# Patient Record
Sex: FEMALE | Race: WHITE
[De-identification: names, ages, dates, MRNs, and addresses within clinical notes are randomized per-mention and may not be internally consistent; named-entity substitution may affect disease eponyms.]

---

## 2018-04-04 NOTE — HP
PREOPERATIVE HISTORY AND PHYSICAL:

 

DATE OF SURGERY/ADMISSION:  18 [facility]

 

DATE OF OFFICE VISIT/ENCOUNTER:  18

 

ATTENDING SURGEON:  Lavonne Minaya MD * (DICTATED BY DIALLO EPPS)

 

PROCEDURE:  Right wrist ganglion cyst excision.

 

CHIEF COMPLAINT:  Pain and cyst, right wrist.

 

HISTORY OF PRESENT ILLNESS:  This is a 32-year-old female.  She is a physician 
at [employer].  She is complaining of right wrist pain and a cyst-
like structure on the dorsum of the wrist that has been present for some time 
now.  She thinks that she has had pain in the wrist for about 7 years and it is 
related to a time when she was pushing herself up out of a pool and she felt 
some pain in the wrist.  She has noticed what looks to be a cyst-like structure 
on the dorsal radial aspect of the wrist that has grown in size overtime.  She 
recently had an MRI of the wrist, which showed a multiloculated dorsal wrist 
ganglion at the radiocarpal joint.  She is interested and pursuing definitive 
treatment for this problem, and has consented to proceed with surgery in the 
form of a right wrist ganglion cyst excision.

 

PAST MEDICAL HISTORY:  Unremarkable.

 

PAST SURGICAL HISTORY:  .

 

MEDICATIONS:  Prenatal vitamins.

 

ALLERGIES:  No known drug allergies.

 

FAMILY MEDICAL HISTORY:  Asthma, high cholesterol, and hypertension.

 

SOCIAL HISTORY:  The patient is a pediatrician.  She denies tobacco use, 
illicit drug use, and does not drink alcohol.

 

REVIEW OF SYSTEMS:  General:  Negative for fevers, chills, or night sweats.  No 
known anesthesia problems.  HEENT:  Negative for headache, lightheadedness, or 
syncopal episodes.  Integumentary:  Negative for abrasions, lesions, or open 
wounds.  Cardiothoracic:  Negative for hypertension, chest pain, palpitations, 
or edema.  Pulmonary: Negative for shortness of breath with exertion, chronic 
cough, COPD.  GI:  Negative for nausea, vomiting, diarrhea, constipation, or 
GERD.  : Negative for nocturia, urinary frequency, urgency, history of UTIs, 
or kidney problems.  Musculoskeletal:  Positive for current complaint.  
Neurological: Negative for paresthesias, numbness, history of seizure, stroke, 
or epilepsy. Endocrine:  Negative for diabetes or thyroid issues.  Hematologic:
  Negative for easy bruising, anemia, excessive bleeding, or history of DVT.  
Infectious Disease: Negative for history of MRSA, hepatitis C, or HIV.

 

                               PHYSICAL EXAMINATION

 

GENERAL:  Well-developed, well-nourished 32-year-old female, in no acute 
distress.

 

VITAL SIGNS:  Height 5 feet 3 inches, weight 152 pounds, pulse rate 85, blood 
pressure 117/62.

 

HEENT:  Normocephalic, atraumatic.  Pupils are equal, round, and reactive to 
light and accommodation.  Extraocular movements are intact.  Throat is clear.

 

NECK:  Supple.  No palpable lymph nodes.

 

PULMONARY:  Lungs are clear to auscultation bilaterally.  No wheezes, rales, or 
rhonchi.

 

CARDIOVASCULAR:  Regular rate and rhythm.  S1, S2.  No murmurs, rubs, or 
gallops. No edema.

 

ABDOMEN:  Positive bowel sounds, soft, nontender.

 

NEUROLOGICAL:  Alert and oriented x3.  Cranial nerves II through XII are 
intact. Sensation is intact to light touch.

 

MUSCULOSKELETAL:  On exam of her right wrist, there is no visible swelling or 
ecchymosis; however, there does appear to be a cyst-like structure on the 
dorsal radial aspect, particularly when she flexes her wrist, minimally tender 
to palpation increased pain with extension of the wrist particularly with 
weightbearing.  There is a negative Tinel over the cyst.  Neurovascular 
function is intact.

 

 IMAGING STUDIES:  MRI of the right wrist shows a multiloculated dorsal wrist 
ganglion at the radiocarpal joint and the area of the scapholunate junction.

 

IMPRESSION:  Right wrist ganglion.

 

PLAN:  The patient is scheduled to undergo a right wrist ganglion cyst excision 
with Dr. Minaya on 18.  She will return to the office in 10 to 14 days 
postop for followup and suture removal.  A prescription for Ultracet was e-
scribed to the patient's pharmacy for postoperative pain management.

 

 ____________________________________ DIALLO EPPS

 

[id number]/[facility] #: [id number]

AMBER

## 2018-04-13 ENCOUNTER — HOSPITAL ENCOUNTER (OUTPATIENT)
Dept: HOSPITAL 25 - OREAST | Age: 33
Discharge: HOME | End: 2018-04-13
Attending: ORTHOPAEDIC SURGERY
Payer: COMMERCIAL

## 2018-04-13 VITALS — DIASTOLIC BLOOD PRESSURE: 68 MMHG | SYSTOLIC BLOOD PRESSURE: 105 MMHG

## 2018-04-13 DIAGNOSIS — M67.431: Primary | ICD-10-CM

## 2018-04-13 PROCEDURE — 81025 URINE PREGNANCY TEST: CPT

## 2018-04-13 PROCEDURE — 88304 TISSUE EXAM BY PATHOLOGIST: CPT

## 2018-04-13 NOTE — OP
DATE OF OPERATION:  04/13/18 [facility]

 

DATE OF BIRTH:  [birth year]

 

SURGEON:  Lavonne Minaya MD

 

ASSISTANT:  DIALLO Greenwood

 

ANESTHESIA:  Local MAC.

 

PRE-OP DIAGNOSIS:  Ganglion cyst on the right wrist.

 

POST-OP DIAGNOSIS:  Ganglion cyst on the right wrist.

 

OPERATIVE PROCEDURE:  Removal of right wrist ganglion cyst.

 

ESTIMATED BLOOD LOSS:  0.

 

TOURNIQUET TIME:  About 20 minutes.

 

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 
in the dorsal aspect of her right wrist for over a year.  It bothers her when 
she does specific activity such as working out.  MRI shows a multiloculated 
dorsal wrist ganglion.  She presents for removal.

 

DESCRIPTION OF PROCEDURE:  The patient was brought to the operating room, was 
given a sedation anesthetic and a local infiltration of 10 cc of 1% plain 
lidocaine in the dorsal aspect of the right wrist.  The skin of her right upper 
extremity was prepped and draped in the usual sterile fashion.  The hand and 
wrist were exsanguinated and the tourniquet elevated to 250 mmHg.  A transverse 
incision was made centered over the wrist and we dissected bluntly through the 
subcutaneous tissue.  The EPL and 4th compartment tendons were retracted and 
under this I was able to visualize the broad-based multiloculated cyst.  It was 
removed with a portion of the wrist capsule.  The edges of the capsule were 
cauterized with the Bovie and the underlying scapholunate ligament was intact.  
The wound was irrigated and the skin edges reapproximated with 4-0 nylon 
suture.  The wound was dressed with Xeroform, 4 x 4, Webril and an Ace wrap.  
The patient tolerated the procedure well and was brought to the recovery room 
in good condition.

 

 [id number]/[facility] #: [id number]

[facility]

## 2019-04-12 ENCOUNTER — HOSPITAL ENCOUNTER (INPATIENT)
Dept: HOSPITAL 25 - MCHOBOUT | Age: 34
LOS: 2 days | Discharge: HOME | End: 2019-04-14
Attending: OBSTETRICS & GYNECOLOGY | Admitting: OBSTETRICS & GYNECOLOGY
Payer: COMMERCIAL

## 2019-04-12 DIAGNOSIS — O34.211: Primary | ICD-10-CM

## 2019-04-12 DIAGNOSIS — Z3A.39: ICD-10-CM

## 2019-04-12 LAB
BASOPHILS # BLD AUTO: 0.1 10^3/UL (ref 0–0.2)
EOSINOPHIL # BLD AUTO: 0.1 10^3/UL (ref 0–0.6)
HCT VFR BLD AUTO: 32 % (ref 33–41)
HGB BLD-MCNC: 10.5 G/DL (ref 12–16)
LYMPHOCYTES # BLD AUTO: 1.8 10^3/UL (ref 1–4.8)
MCH RBC QN AUTO: 26 PG (ref 27–31)
MCHC RBC AUTO-ENTMCNC: 33 G/DL (ref 31–36)
MCV RBC AUTO: 79 FL (ref 80–97)
MONOCYTES # BLD AUTO: 0.6 10^3/UL (ref 0–0.8)
NEUTROPHILS # BLD AUTO: 11.6 10^3/UL (ref 1.5–7.7)
NRBC # BLD AUTO: 0 10^3/UL
NRBC BLD QL AUTO: 0.1
PLATELET # BLD AUTO: 277 10^3/UL (ref 150–450)
RBC # BLD AUTO: 4.01 10^6 /UL (ref 3.7–4.87)
WBC # BLD AUTO: 14.3 10^3/UL (ref 3.5–10.8)

## 2019-04-12 PROCEDURE — 86900 BLOOD TYPING SEROLOGIC ABO: CPT

## 2019-04-12 PROCEDURE — 85025 COMPLETE CBC W/AUTO DIFF WBC: CPT

## 2019-04-12 PROCEDURE — 0KQM0ZZ REPAIR PERINEUM MUSCLE, OPEN APPROACH: ICD-10-PCS | Performed by: OBSTETRICS & GYNECOLOGY

## 2019-04-12 PROCEDURE — 86901 BLOOD TYPING SEROLOGIC RH(D): CPT

## 2019-04-12 PROCEDURE — 4A1HXCZ MONITORING OF PRODUCTS OF CONCEPTION, CARDIAC RATE, EXTERNAL APPROACH: ICD-10-PCS | Performed by: OBSTETRICS & GYNECOLOGY

## 2019-04-12 PROCEDURE — 86850 RBC ANTIBODY SCREEN: CPT

## 2019-04-12 PROCEDURE — 36415 COLL VENOUS BLD VENIPUNCTURE: CPT

## 2019-04-12 PROCEDURE — 10907ZC DRAINAGE OF AMNIOTIC FLUID, THERAPEUTIC FROM PRODUCTS OF CONCEPTION, VIA NATURAL OR ARTIFICIAL OPENING: ICD-10-PCS | Performed by: OBSTETRICS & GYNECOLOGY

## 2019-04-12 RX ADMIN — IBUPROFEN PRN MG: 600 TABLET, FILM COATED ORAL at 20:00

## 2019-04-12 RX ADMIN — DOCUSATE SODIUM SCH MG: 100 CAPSULE, LIQUID FILLED ORAL at 14:35

## 2019-04-12 RX ADMIN — WITCH HAZEL PRN PAD: 5 CLOTH TOPICAL at 14:34

## 2019-04-12 RX ADMIN — IBUPROFEN PRN MG: 600 TABLET, FILM COATED ORAL at 12:27

## 2019-04-12 RX ADMIN — DIBUCAINE PRN APPLIC: 1 OINTMENT TOPICAL at 14:35

## 2019-04-12 NOTE — HP
General Information





- Reason for Visit





contractions





- General Information


Maternal Age: 33


Grav: 3


Para: 2


SAB: 0


IEA: 0





Estimated Due Date: 19


Determined By: Early Ultrasound


Maternal Blood Type and Rh: B Positive





- Results this Pregnancy


Serology/RPR Result: Non-Reactive


Rubella Result: Immune


HBsAg Result: Negative


HIV Result: Negative


GBS Culture Result: Negative





Past Medical History


Delivery History: See  Records


Pertinent Past Medical History: See  Records


Pertinent Family History: See  Records





- Antepartal Records


Antepartal Records: Reviewed, Pregnancy Complicated by: - desires vaginal birth 

after  section





Review of Systems


Constitutional: Uncomfortable


CV Complaint: No


Respiratory: Shortness of Breath: No


Gastrointestinal: No Nausea/Vomiting


Genitourinary: No Dysuria, No Bleeding, No Leaking Fluid


Musculoskeletal: Contractions


Neurological: No Headache


Fetal Movement: Normal





Exam


Allergies/Adverse Reactions: 


Allergies





No Known Allergies Allergy (Verified 19 06:11)


 











T: 98.9  P : 91  BP : 127/82  RR: 20


Lab Values - Entire Visit: 


 Laboratory Tests











  19





  05:31 05:31


 


WBC  14.3 H 


 


RBC  4.01 


 


Hgb  10.5 L 


 


Hct  32 L 


 


MCV  79 L 


 


MCH  26 L 


 


MCHC  33 


 


RDW  14 


 


Plt Count  277 


 


MPV  7.9 


 


Neut % (Auto)  81.4 


 


Lymph % (Auto)  12.7 


 


Mono % (Auto)  4.5 


 


Eos % (Auto)  1.0 


 


Baso % (Auto)  0.4 


 


Absolute Neuts (auto)  11.6 H 


 


Absolute Lymphs (auto)  1.8 


 


Absolute Monos (auto)  0.6 


 


Absolute Eos (auto)  0.1 


 


Absolute Basos (auto)  0.1 


 


Absolute Nucleated RBC  0 


 


Nucleated RBC %  0.1 


 


Blood Type   B Positive














- Measurements


Height: 5 ft 3 in


Weight: 181 lb


Weight in lbs: 181.105528


Body Mass Index (BMI): 32.1


Pre-Pregnancy Weight: 138 lb


Weight Gained This Pregnancy: 43 lbs and 0 ozs





- Exam


Breast: Breast Exam Deferred


CVA: No CVA Tenderness


Extremities: No Edema


Heart: Normal Rhythm/Heart Sounds


HEENT: No Significant Findings


Lungs: Clear Bilaterally


Rectal: Rectal Exam Deferred


Reflexes: DTR 2+


Thyroid: No Thyromegaly





- Abdominal Exam


Abdomen Exam: Non-Tender





- Ultrasound/Biophysical Profile


Ultrasound Status: Not Done





Targeted Exam Findings


Cervical Exam: 2cm


Effacement: 80%


Station: -2


Presenting Part: Vertex


Membrane Status: Intact





EFM Findings





- External Fetal Monitor Findings


Baseline Fetal Heart Rate: 140


External Fetal Monitor Findings: Accelerations Present, No Pattern of Variable 

or Late Decelerations, Variability Moderate


Contractions: Regular - q 5'

## 2019-04-12 NOTE — PROCNOTE
[facility] OB: Delivery Note





- Delivery


  ** A


Date of Birth: [birth year]


Time of Birth: 08:48


 Weight at Birth: 6 lb 11 oz


Apgar Score 1 Minute: 8


Apgar Score 5 Minutes: 8


Gestational Age in Weeks and Days at Delivery: 39 Weeks and 2 Days


Delivery Method: Spontaneous Vaginal


Labor: Spontaneous


Reason for  Section: successful 


Did Patient attempt ?: Yes, Successful 


Amniotic Fluid: Clear


Estimated Blood Loss: 200


Anesthesia/Analgesia: CEI for Labor


Delivered By: Vineet Walker





- Nursery


Level of Nursery: Regular/Bedside





- Perineum


Perineal Injury: 2nd Degree


Perineal Repair: By Delivering Practioner - reapir with 3-0 and 4-0 vicryl





- Events


Delivery Events of Note: Pitocin Only After Delivery


Delivery Events of Note Comment: baby  with good fhr throughout and good 

tone and breathing and heart rate. Initially poor color despite crying and baby 

given initila blow by and then cpap through mask with a good response





- Risk for Falls


Other Risk for Falls: none

## 2019-04-13 LAB
BASOPHILS # BLD AUTO: 0 10^3/UL (ref 0–0.2)
EOSINOPHIL # BLD AUTO: 0.2 10^3/UL (ref 0–0.6)
HCT VFR BLD AUTO: 25 % (ref 33–41)
HGB BLD-MCNC: 8.1 G/DL (ref 12–16)
LYMPHOCYTES # BLD AUTO: 2.2 10^3/UL (ref 1–4.8)
MCH RBC QN AUTO: 26 PG (ref 27–31)
MCHC RBC AUTO-ENTMCNC: 33 G/DL (ref 31–36)
MCV RBC AUTO: 80 FL (ref 80–97)
MONOCYTES # BLD AUTO: 0.6 10^3/UL (ref 0–0.8)
NEUTROPHILS # BLD AUTO: 11 10^3/UL (ref 1.5–7.7)
NRBC # BLD AUTO: 0 10^3/UL
NRBC BLD QL AUTO: 0
PLATELET # BLD AUTO: 213 10^3/UL (ref 150–450)
RBC # BLD AUTO: 3.08 10^6 /UL (ref 3.7–4.87)
WBC # BLD AUTO: 14 10^3/UL (ref 3.5–10.8)

## 2019-04-13 RX ADMIN — IBUPROFEN PRN MG: 600 TABLET, FILM COATED ORAL at 22:01

## 2019-04-13 RX ADMIN — SIMETHICONE CHEW TAB 80 MG SCH: 80 TABLET ORAL at 07:32

## 2019-04-13 RX ADMIN — DOCUSATE SODIUM SCH MG: 100 CAPSULE, LIQUID FILLED ORAL at 20:16

## 2019-04-13 RX ADMIN — IBUPROFEN PRN MG: 600 TABLET, FILM COATED ORAL at 14:37

## 2019-04-13 RX ADMIN — IBUPROFEN PRN MG: 600 TABLET, FILM COATED ORAL at 07:20

## 2019-04-13 RX ADMIN — DOCUSATE SODIUM SCH: 100 CAPSULE, LIQUID FILLED ORAL at 07:31

## 2019-04-13 RX ADMIN — Medication SCH MG: at 20:16

## 2019-04-13 RX ADMIN — DOCUSATE SODIUM SCH MG: 100 CAPSULE, LIQUID FILLED ORAL at 14:37

## 2019-04-13 RX ADMIN — Medication SCH MG: at 07:20

## 2019-04-13 RX ADMIN — DOCUSATE SODIUM SCH MG: 100 CAPSULE, LIQUID FILLED ORAL at 07:20

## 2019-04-14 VITALS — DIASTOLIC BLOOD PRESSURE: 72 MMHG | SYSTOLIC BLOOD PRESSURE: 131 MMHG

## 2019-04-14 RX ADMIN — WITCH HAZEL PRN PAD: 5 CLOTH TOPICAL at 07:48

## 2019-04-14 RX ADMIN — DIBUCAINE PRN APPLIC: 1 OINTMENT TOPICAL at 07:48

## 2019-04-14 RX ADMIN — IBUPROFEN PRN MG: 600 TABLET, FILM COATED ORAL at 07:48

## 2019-04-14 RX ADMIN — Medication SCH MG: at 07:48

## 2019-04-14 RX ADMIN — DOCUSATE SODIUM SCH MG: 100 CAPSULE, LIQUID FILLED ORAL at 07:48

## 2023-03-10 LAB
ALANINE AMINOTRANSFERASE (SGPT) (U/L) IN SER/PLAS: 11 U/L (ref 7–45)
ALBUMIN (G/DL) IN SER/PLAS: 4 G/DL (ref 3.4–5)
ALKALINE PHOSPHATASE (U/L) IN SER/PLAS: 47 U/L (ref 33–110)
ANION GAP IN SER/PLAS: 10 MMOL/L (ref 10–20)
ASPARTATE AMINOTRANSFERASE (SGOT) (U/L) IN SER/PLAS: 15 U/L (ref 9–39)
BASOPHILS (10*3/UL) IN BLOOD BY AUTOMATED COUNT: 0.06 X10E9/L (ref 0–0.1)
BASOPHILS/100 LEUKOCYTES IN BLOOD BY AUTOMATED COUNT: 1 % (ref 0–2)
BILIRUBIN TOTAL (MG/DL) IN SER/PLAS: 0.3 MG/DL (ref 0–1.2)
CALCIUM (MG/DL) IN SER/PLAS: 8.7 MG/DL (ref 8.6–10.3)
CARBON DIOXIDE, TOTAL (MMOL/L) IN SER/PLAS: 28 MMOL/L (ref 21–32)
CHLORIDE (MMOL/L) IN SER/PLAS: 106 MMOL/L (ref 98–107)
CHOLESTEROL (MG/DL) IN SER/PLAS: 152 MG/DL (ref 0–199)
CHOLESTEROL IN HDL (MG/DL) IN SER/PLAS: 51.1 MG/DL
CHOLESTEROL/HDL RATIO: 3
CREATININE (MG/DL) IN SER/PLAS: 0.82 MG/DL (ref 0.5–1.05)
EOSINOPHILS (10*3/UL) IN BLOOD BY AUTOMATED COUNT: 0.14 X10E9/L (ref 0–0.7)
EOSINOPHILS/100 LEUKOCYTES IN BLOOD BY AUTOMATED COUNT: 2.2 % (ref 0–6)
ERYTHROCYTE DISTRIBUTION WIDTH (RATIO) BY AUTOMATED COUNT: 12.8 % (ref 11.5–14.5)
ERYTHROCYTE MEAN CORPUSCULAR HEMOGLOBIN CONCENTRATION (G/DL) BY AUTOMATED: 31.9 G/DL (ref 32–36)
ERYTHROCYTE MEAN CORPUSCULAR VOLUME (FL) BY AUTOMATED COUNT: 86 FL (ref 80–100)
ERYTHROCYTES (10*6/UL) IN BLOOD BY AUTOMATED COUNT: 4.28 X10E12/L (ref 4–5.2)
GFR FEMALE: >90 ML/MIN/1.73M2
GLUCOSE (MG/DL) IN SER/PLAS: 71 MG/DL (ref 74–99)
HEMATOCRIT (%) IN BLOOD BY AUTOMATED COUNT: 36.7 % (ref 36–46)
HEMOGLOBIN (G/DL) IN BLOOD: 11.7 G/DL (ref 12–16)
IMMATURE GRANULOCYTES/100 LEUKOCYTES IN BLOOD BY AUTOMATED COUNT: 0.3 % (ref 0–0.9)
LDL: 86 MG/DL (ref 0–99)
LEUKOCYTES (10*3/UL) IN BLOOD BY AUTOMATED COUNT: 6.2 X10E9/L (ref 4.4–11.3)
LYMPHOCYTES (10*3/UL) IN BLOOD BY AUTOMATED COUNT: 1.66 X10E9/L (ref 1.2–4.8)
LYMPHOCYTES/100 LEUKOCYTES IN BLOOD BY AUTOMATED COUNT: 26.6 % (ref 13–44)
MONOCYTES (10*3/UL) IN BLOOD BY AUTOMATED COUNT: 0.43 X10E9/L (ref 0.1–1)
MONOCYTES/100 LEUKOCYTES IN BLOOD BY AUTOMATED COUNT: 6.9 % (ref 2–10)
NEUTROPHILS (10*3/UL) IN BLOOD BY AUTOMATED COUNT: 3.92 X10E9/L (ref 1.2–7.7)
NEUTROPHILS/100 LEUKOCYTES IN BLOOD BY AUTOMATED COUNT: 63 % (ref 40–80)
PLATELETS (10*3/UL) IN BLOOD AUTOMATED COUNT: 232 X10E9/L (ref 150–450)
POTASSIUM (MMOL/L) IN SER/PLAS: 4.2 MMOL/L (ref 3.5–5.3)
PROTEIN TOTAL: 6.4 G/DL (ref 6.4–8.2)
SODIUM (MMOL/L) IN SER/PLAS: 140 MMOL/L (ref 136–145)
TRIGLYCERIDE (MG/DL) IN SER/PLAS: 73 MG/DL (ref 0–149)
UREA NITROGEN (MG/DL) IN SER/PLAS: 17 MG/DL (ref 6–23)
VLDL: 15 MG/DL (ref 0–40)

## 2023-03-13 LAB — THYROTROPIN (MIU/L) IN SER/PLAS BY DETECTION LIMIT <= 0.05 MIU/L: 3.5 MIU/L (ref 0.44–3.98)

## 2023-09-26 LAB
BASOPHILS (10*3/UL) IN BLOOD BY AUTOMATED COUNT: 0.07 X10E9/L (ref 0–0.1)
BASOPHILS/100 LEUKOCYTES IN BLOOD BY AUTOMATED COUNT: 1.1 % (ref 0–2)
COBALAMIN (VITAMIN B12) (PG/ML) IN SER/PLAS: 425 PG/ML (ref 211–911)
EOSINOPHILS (10*3/UL) IN BLOOD BY AUTOMATED COUNT: 0.19 X10E9/L (ref 0–0.7)
EOSINOPHILS/100 LEUKOCYTES IN BLOOD BY AUTOMATED COUNT: 2.9 % (ref 0–6)
ERYTHROCYTE DISTRIBUTION WIDTH (RATIO) BY AUTOMATED COUNT: 12.1 % (ref 11.5–14.5)
ERYTHROCYTE MEAN CORPUSCULAR HEMOGLOBIN CONCENTRATION (G/DL) BY AUTOMATED: 31.3 G/DL (ref 32–36)
ERYTHROCYTE MEAN CORPUSCULAR VOLUME (FL) BY AUTOMATED COUNT: 90 FL (ref 80–100)
ERYTHROCYTES (10*6/UL) IN BLOOD BY AUTOMATED COUNT: 4.63 X10E12/L (ref 4–5.2)
HEMATOCRIT (%) IN BLOOD BY AUTOMATED COUNT: 41.6 % (ref 36–46)
HEMOGLOBIN (G/DL) IN BLOOD: 13 G/DL (ref 12–16)
IMMATURE GRANULOCYTES/100 LEUKOCYTES IN BLOOD BY AUTOMATED COUNT: 0.2 % (ref 0–0.9)
IRON (UG/DL) IN SER/PLAS: 52 UG/DL (ref 35–150)
IRON BINDING CAPACITY (UG/DL) IN SER/PLAS: 374 UG/DL (ref 240–445)
IRON SATURATION (%) IN SER/PLAS: 14 % (ref 25–45)
LEUKOCYTES (10*3/UL) IN BLOOD BY AUTOMATED COUNT: 6.6 X10E9/L (ref 4.4–11.3)
LYMPHOCYTES (10*3/UL) IN BLOOD BY AUTOMATED COUNT: 1.99 X10E9/L (ref 1.2–4.8)
LYMPHOCYTES/100 LEUKOCYTES IN BLOOD BY AUTOMATED COUNT: 30.2 % (ref 13–44)
MONOCYTES (10*3/UL) IN BLOOD BY AUTOMATED COUNT: 0.32 X10E9/L (ref 0.1–1)
MONOCYTES/100 LEUKOCYTES IN BLOOD BY AUTOMATED COUNT: 4.8 % (ref 2–10)
NEUTROPHILS (10*3/UL) IN BLOOD BY AUTOMATED COUNT: 4.02 X10E9/L (ref 1.2–7.7)
NEUTROPHILS/100 LEUKOCYTES IN BLOOD BY AUTOMATED COUNT: 60.8 % (ref 40–80)
NRBC (PER 100 WBCS) BY AUTOMATED COUNT: 0 /100 WBC (ref 0–0)
PLATELETS (10*3/UL) IN BLOOD AUTOMATED COUNT: 287 X10E9/L (ref 150–450)

## 2024-01-04 DIAGNOSIS — F41.9 ANXIETY AND DEPRESSION: ICD-10-CM

## 2024-01-04 DIAGNOSIS — F32.A ANXIETY AND DEPRESSION: ICD-10-CM

## 2024-01-04 PROBLEM — F41.8 DEPRESSION WITH ANXIETY: Status: ACTIVE | Noted: 2024-01-04

## 2024-01-04 PROBLEM — L91.8 OTHER HYPERTROPHIC DISORDERS OF THE SKIN: Status: ACTIVE | Noted: 2022-09-28

## 2024-01-04 PROBLEM — E66.9 OBESITY (BMI 30.0-34.9): Status: ACTIVE | Noted: 2024-01-04

## 2024-01-04 PROBLEM — E66.811 OBESITY (BMI 30.0-34.9): Status: ACTIVE | Noted: 2024-01-04

## 2024-01-04 PROBLEM — L81.4 OTHER MELANIN HYPERPIGMENTATION: Status: ACTIVE | Noted: 2022-09-28

## 2024-01-04 PROBLEM — R79.89 LOW VITAMIN D LEVEL: Status: ACTIVE | Noted: 2024-01-04

## 2024-01-04 PROBLEM — D18.01 HEMANGIOMA OF SKIN AND SUBCUTANEOUS TISSUE: Status: ACTIVE | Noted: 2022-09-28

## 2024-01-04 PROBLEM — D22.5 MELANOCYTIC NEVI OF TRUNK: Status: ACTIVE | Noted: 2022-09-28

## 2024-01-04 RX ORDER — LEVONORGESTREL 52 MG/1
INTRAUTERINE DEVICE INTRAUTERINE
COMMUNITY
Start: 2022-02-25

## 2024-01-04 RX ORDER — NALTREXONE HYDROCHLORIDE 50 MG/1
25 TABLET, FILM COATED ORAL
COMMUNITY
Start: 2023-06-15

## 2024-01-04 RX ORDER — ESCITALOPRAM OXALATE 10 MG/1
15 TABLET ORAL DAILY
Qty: 135 TABLET | Refills: 1 | Status: SHIPPED | OUTPATIENT
Start: 2024-01-04 | End: 2024-07-02

## 2024-01-04 RX ORDER — BUPROPION HYDROCHLORIDE 100 MG/1
TABLET ORAL
COMMUNITY
Start: 2023-06-15

## 2024-05-17 ENCOUNTER — APPOINTMENT (OUTPATIENT)
Dept: DERMATOLOGY | Facility: CLINIC | Age: 39
End: 2024-05-17
Payer: COMMERCIAL

## 2024-07-16 ENCOUNTER — APPOINTMENT (OUTPATIENT)
Dept: OBSTETRICS AND GYNECOLOGY | Facility: CLINIC | Age: 39
End: 2024-07-16
Payer: COMMERCIAL

## 2024-07-16 VITALS
BODY MASS INDEX: 33.13 KG/M2 | WEIGHT: 180 LBS | HEIGHT: 62 IN | SYSTOLIC BLOOD PRESSURE: 130 MMHG | DIASTOLIC BLOOD PRESSURE: 69 MMHG

## 2024-07-16 DIAGNOSIS — Z30.432 ENCOUNTER FOR REMOVAL OF INTRAUTERINE CONTRACEPTIVE DEVICE (IUD): ICD-10-CM

## 2024-07-16 DIAGNOSIS — Z01.419 WELL WOMAN EXAM: Primary | ICD-10-CM

## 2024-07-16 DIAGNOSIS — Z12.31 BREAST CANCER SCREENING BY MAMMOGRAM: ICD-10-CM

## 2024-07-16 PROCEDURE — 58301 REMOVE INTRAUTERINE DEVICE: CPT | Performed by: OBSTETRICS & GYNECOLOGY

## 2024-07-16 PROCEDURE — 1036F TOBACCO NON-USER: CPT | Performed by: OBSTETRICS & GYNECOLOGY

## 2024-07-16 PROCEDURE — 99395 PREV VISIT EST AGE 18-39: CPT | Performed by: OBSTETRICS & GYNECOLOGY

## 2024-07-16 ASSESSMENT — ENCOUNTER SYMPTOMS
SLEEP DISTURBANCE: 0
NAUSEA: 0
ABDOMINAL DISTENTION: 0
DIARRHEA: 0
HEMATURIA: 0
BACK PAIN: 0
SHORTNESS OF BREATH: 0
FEVER: 0
FLANK PAIN: 0
VOMITING: 0
FREQUENCY: 0
BLOOD IN STOOL: 0
CHILLS: 0
UNEXPECTED WEIGHT CHANGE: 0
APPETITE CHANGE: 0
DYSURIA: 0
CONSTIPATION: 0
ABDOMINAL PAIN: 0
COLOR CHANGE: 0
FATIGUE: 0

## 2024-07-16 ASSESSMENT — PAIN SCALES - GENERAL: PAINLEVEL: 0-NO PAIN

## 2024-07-16 NOTE — PROGRESS NOTES
"Tung Davis is a 38 y.o.  here for well woman exam. Pt would also like IUD removed today    Past med hx and past surg hx reviewed with patient and notable for: no new health issues    Concerns: none    Exercise: Cross Fit, training for half marathon    GynHx:  Cycles: none with IUD  Sexually active: Yes with one male partner/   Contraception: Liletta IUD, placed 5 years ago, would like IUD removed. Aware IUD is good for birth control for up to 8 years.   No LMP recorded (lmp unknown). Patient has had an implant.     OB History          3    Para        Term                AB        Living   3         SAB        IAB        Ectopic        Multiple        Live Births                     Objective     Review of Systems   Constitutional:  Negative for appetite change, chills, fatigue, fever and unexpected weight change.   Respiratory:  Negative for shortness of breath.    Cardiovascular:  Negative for chest pain.   Gastrointestinal:  Negative for abdominal distention, abdominal pain, blood in stool, constipation, diarrhea, nausea and vomiting.   Endocrine: Negative for cold intolerance and heat intolerance.   Genitourinary:  Negative for dyspareunia, dysuria, flank pain, frequency, genital sores, hematuria, menstrual problem, pelvic pain, urgency, vaginal bleeding, vaginal discharge and vaginal pain.   Musculoskeletal:  Negative for back pain.   Skin:  Negative for color change.   Psychiatric/Behavioral:  Negative for sleep disturbance.        /69 (BP Location: Left arm, Patient Position: Sitting)   Ht 1.575 m (5' 2\")   Wt 81.6 kg (180 lb)   LMP  (LMP Unknown)   BMI 32.92 kg/m²     Physical Exam  Constitutional:       Appearance: Normal appearance.   HENT:      Head: Normocephalic and atraumatic.   Chest:   Breasts:     Right: Normal.      Left: Normal.   Abdominal:      General: Abdomen is flat.      Palpations: Abdomen is soft.      Tenderness: There is no abdominal tenderness. "   Genitourinary:     General: Normal vulva.      Vagina: Normal.      Cervix: Normal.      Uterus: Normal.       Adnexa: Right adnexa normal and left adnexa normal.      Comments: +IUD thread seen   Skin:     General: Skin is warm and dry.   Neurological:      Mental Status: She is alert and oriented to person, place, and time.   Psychiatric:         Mood and Affect: Mood normal.            Patient ID: Tung Davis is a 38 y.o. female.    IUD Removal    Performed by: Meghan BEDOYA MD  Authorized by: Meghan BEDOYA MD    Procedure: IUD removal    Consent obtained by patient, parent, or legal power of  - including discussion of procedure risks and benefits, patient questions answered, and patient education provided: yes    Reason for removal: patient request    Strings visualized: yes    IUD grasped by forceps: yes    IUD removed: yes    Date/Time of Removal:  7/16/2024 2:29 PM  Removed without complications: yes    IUD intact: yes           Assessment and Plan:  Routine Well Woman Exam Today.   Discussed diet and exercise and routine health screening.   Pap: pap 2022 wnl   Start annual mammograms at 38 yo (10/2024)  Pt planning to start colon cancer screening in the next year as well due to family history of colon cancer    Contraception  IUD removed at patient request  Declines other methods of contraception offered  Will use condoms          No orders of the defined types were placed in this encounter.

## 2024-08-06 ENCOUNTER — HOSPITAL ENCOUNTER (OUTPATIENT)
Dept: RADIOLOGY | Facility: HOSPITAL | Age: 39
Discharge: HOME | End: 2024-08-06
Payer: COMMERCIAL

## 2024-08-06 ENCOUNTER — OFFICE VISIT (OUTPATIENT)
Dept: ORTHOPEDIC SURGERY | Facility: HOSPITAL | Age: 39
End: 2024-08-06
Payer: COMMERCIAL

## 2024-08-06 VITALS — WEIGHT: 180 LBS | HEIGHT: 62 IN | BODY MASS INDEX: 33.13 KG/M2

## 2024-08-06 DIAGNOSIS — S52.501A CLOSED TRAUMATIC NONDISPLACED FRACTURE OF DISTAL END OF RIGHT RADIUS, INITIAL ENCOUNTER: Primary | ICD-10-CM

## 2024-08-06 DIAGNOSIS — M25.531 WRIST PAIN, ACUTE, RIGHT: ICD-10-CM

## 2024-08-06 PROCEDURE — 99203 OFFICE O/P NEW LOW 30 MIN: CPT | Performed by: STUDENT IN AN ORGANIZED HEALTH CARE EDUCATION/TRAINING PROGRAM

## 2024-08-06 PROCEDURE — 73110 X-RAY EXAM OF WRIST: CPT | Mod: RT

## 2024-08-06 PROCEDURE — 99213 OFFICE O/P EST LOW 20 MIN: CPT | Performed by: STUDENT IN AN ORGANIZED HEALTH CARE EDUCATION/TRAINING PROGRAM

## 2024-08-06 PROCEDURE — 3008F BODY MASS INDEX DOCD: CPT | Performed by: STUDENT IN AN ORGANIZED HEALTH CARE EDUCATION/TRAINING PROGRAM

## 2024-08-06 PROCEDURE — 73110 X-RAY EXAM OF WRIST: CPT | Mod: RIGHT SIDE | Performed by: RADIOLOGY

## 2024-08-06 PROCEDURE — 1036F TOBACCO NON-USER: CPT | Performed by: STUDENT IN AN ORGANIZED HEALTH CARE EDUCATION/TRAINING PROGRAM

## 2024-08-06 ASSESSMENT — PAIN - FUNCTIONAL ASSESSMENT: PAIN_FUNCTIONAL_ASSESSMENT: 0-10

## 2024-08-06 ASSESSMENT — PAIN SCALES - GENERAL: PAINLEVEL_OUTOF10: 8

## 2024-08-06 ASSESSMENT — PAIN DESCRIPTION - DESCRIPTORS: DESCRIPTORS: SHARP;THROBBING

## 2024-08-06 NOTE — PROGRESS NOTES
REFERRAL SOURCE: No ref. provider found     CHIEF COMPLAINT: right wrist/hand pain  - orthopedic injury clinic evaluation    HISTORY OF PRESENT ILLNESS  Tung Davis is a very pleasant 38 y.o. right hand dominant female who is here for evaluation of right wrist pain.     24: This morning, she was running and tripped and fell onto an outstretched hand and heard a crack and had immediate pain in the right wrist.  She has some slight numbness over the dorsal aspect of the wrist into the proximal thumb area.  Denies weakness in the hand or numbness and tingling in the fingers.  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 [email or web address]    Current Outpatient Medications:     escitalopram (Lexapro) 10 mg tablet, Take 1.5 tablets (15 mg) by mouth once daily., Disp: 135 tablet, Rfl: 1    levonorgestreL (Liletta) 20.4 mcg/24 hrs (8 yrs) 52 mg intrauterine device, by intrauterine route., Disp: , Rfl:     ALLERGIES  No Known Allergies    PAST MEDICAL HISTORY  Past Medical History:   Diagnosis Date    Ganglion, right wrist 2022    Ganglion of right wrist    Other specified anxiety disorders 2022    Anxiety with depression       PAST SURGICAL HISTORY  Past Surgical History:   Procedure Laterality Date     SECTION, LOW TRANSVERSE  2014    OTHER SURGICAL HISTORY  2022    Wrist surgery    OTHER SURGICAL HISTORY  2022     section       SOCIAL HISTORY   Social History     Socioeconomic History    Marital status:      Spouse name: Not on file    Number of children: Not on file    Years of education: Not on file    Highest education level: Not on file   Occupational History    Not on file   Tobacco Use    Smoking status: Never    Smokeless tobacco: Never   Vaping Use    Vaping status: Never Used   Substance and Sexual Activity    Alcohol use: Never    Drug use: Never    Sexual activity: Yes     Partners: Male     Birth control/protection: I.U.D.   Other Topics Concern    Not  on file   Social History Narrative    Not on file     Social Determinants of Health     Financial Resource Strain: Not on file   Food Insecurity: Not on file   Transportation Needs: Not on file   Physical Activity: Not on file   Stress: Not on file   Social Connections: Not on file   Intimate Partner Violence: Not on file   Housing Stability: Not on file       FAMILY HISTORY  Family History   Problem Relation Name Age of Onset    Asthma Mother Mom        REVIEW OF SYSTEMS  Except for those mentioned in the history of present illness, and below, a complete review of systems is negative.     Review of Systems    VITALS  There were no vitals filed for this visit.    PHYSICAL EXAMINATION   GENERAL:  Awake, alert, and oriented, no apparent distress, pleasant, and cooperative  PSYC: Mood is euthymic, affect is congruent  EAR, NOSE, THROAT:  Normocephalic, atraumatic, moist membranes, anicteric sclera  LUNG: Nonlabored breathing  HEART: No clubbing or cyanosis  SKIN: No increased erythema, warmth, rashes, or concerning skin lesions  NEURO: Sensation is intact in the bilateral upper extremities. Strength is grossly 5 out of 5 throughout the bilateral upper extremities, unless noted below.  GAIT: Non-antalgic.  Examination of the right wrist and hand: Wrist range of motion is full and pain free. No swelling or ecchymosis. No thenar or hypothenar atrophy. No tenderness with palpation of the wrist joint, TFCC region, ECU tendon, 1st dorsal compartment, 1st CMC joint or STT joint. No tenderness with palpation of the scaphoid. CMC grind test is negative. Finklestein's test is negative. Negative Tinel's over the median nerve at the wrist.  Negative Phalen's and negative carpal tunnel compression test.    IMAGING STUDIES:   Radiographs of the right wrist dated 8/6/2024 were personally reviewed and interpreted by me, Dr. Ally Mohamud, and the findings shared with the patient.  Comminuted fracture of the radial styloid with  intra-articular extension.     IMPRESSION  #1  Right distal radius comminuted fracture with intra-articular extension without significant displacement, sustained on 8/6/24    PLAN  The following was discussed with the patient:    Tung Davis is a very pleasant 38 y.o. female right hand dominant female who is here for evaluation of right wrist pain due to right distal radius comminuted fracture with intra-articular extension without significant displacement.  -We reviewed the radiographs and discussed the above.  -She was placed in a short arm splint for immobilization and was counseled on proper wear and care of the splint.  -She will follow-up with my hand surgery colleagues for continued management.    Patient message me on 8/8/2024 due to difficulty sleeping due to pain.  She was given a prescription for oxycodone 5 mg every 6 hours for 3 days.  Reviewed OARRS prior to dispensing.    The patient was counseled to remain active, but avoid activities that worsen symptoms. The patient was in agreement with this plan. All questions were answered to the best of my ability.    PATIENT EDUCATION:  Education was discussed at today's appointment. A learning needs assessment was performed.    Primary learner: Tung Davis  Barriers to learning: None  Preferred language: English  Learning preferences include: Seeing and doing.  Discussed: Diagnosis and treatment plan.  Demonstrated: Understanding of material discussed.  Patient education materials given: None.  Learner response: Learner demonstrated understanding.    This note was dictated using Dragon speech recognition software and was not corrected for spelling or grammatical errors.

## 2024-08-08 RX ORDER — OXYCODONE HYDROCHLORIDE 5 MG/1
5 TABLET ORAL EVERY 6 HOURS PRN
Qty: 12 TABLET | Refills: 0 | Status: SHIPPED | OUTPATIENT
Start: 2024-08-08 | End: 2024-08-11

## 2024-08-13 ENCOUNTER — OFFICE VISIT (OUTPATIENT)
Dept: ORTHOPEDIC SURGERY | Facility: HOSPITAL | Age: 39
End: 2024-08-13
Payer: COMMERCIAL

## 2024-08-13 DIAGNOSIS — S52.601A CLOSED FRACTURE OF RIGHT DISTAL RADIUS AND ULNA, INITIAL ENCOUNTER: Primary | ICD-10-CM

## 2024-08-13 DIAGNOSIS — S52.501A CLOSED FRACTURE OF RIGHT DISTAL RADIUS AND ULNA, INITIAL ENCOUNTER: Primary | ICD-10-CM

## 2024-08-13 PROCEDURE — 99213 OFFICE O/P EST LOW 20 MIN: CPT | Performed by: STUDENT IN AN ORGANIZED HEALTH CARE EDUCATION/TRAINING PROGRAM

## 2024-08-13 PROCEDURE — 99203 OFFICE O/P NEW LOW 30 MIN: CPT | Performed by: STUDENT IN AN ORGANIZED HEALTH CARE EDUCATION/TRAINING PROGRAM

## 2024-08-13 ASSESSMENT — PAIN - FUNCTIONAL ASSESSMENT: PAIN_FUNCTIONAL_ASSESSMENT: 0-10

## 2024-08-13 ASSESSMENT — PAIN DESCRIPTION - DESCRIPTORS: DESCRIPTORS: ACHING

## 2024-08-13 ASSESSMENT — PAIN SCALES - GENERAL: PAINLEVEL_OUTOF10: 5 - MODERATE PAIN

## 2024-08-13 NOTE — H&P (VIEW-ONLY)
Patient ID: Tung Davis is a 38 y.o. female.    History of Present Illness  Patient presents today for evaluation of side: right upper extremity pain.    The patient sustained an acute injury on  2024 .  The patient fell while jogging.  She had acute pain to her right wrist.  She was seen in injury clinic and was found to have a right distal radius fracture which was intra-articular.  She was provided with a splint.  The patient denies any loss of consciousness or additional significant injuries.  The patient denies any current numbness or tingling.  The pain is sharp, acute in nature, better with rest worse with motion.    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     Past Medical History:   Diagnosis Date    Ganglion, right wrist 2022    Ganglion of right wrist    Other specified anxiety disorders 2022    Anxiety with depression       Medication Documentation Review Audit       Reviewed by Tasha Sharma MA (Medical Assistant) on 24 at 0757      Medication Order Taking? Sig Documenting Provider Last Dose Status   escitalopram (Lexapro) 10 mg tablet [id number]  Take 1.5 tablets (15 mg) by mouth once daily. Annemarie Nj MD   24 2359   levonorgestreL (Liletta) 20.4 mcg/24 hrs (8 yrs) 52 mg intrauterine device [id number]  by intrauterine route. Historical Provider, MD  Active   oxyCODONE (Roxicodone) 5 mg immediate release tablet [id number]  Take 1 tablet (5 mg) by mouth every 6 hours if needed for severe pain (7 - 10) for up to 3 days. Ally Mohamud MD   24 2359                    No Known Allergies    Social History     Socioeconomic History    Marital status:      Spouse name: Not on file    Number of children: Not on file    Years of education: Not on file    Highest education level: Not on file   Occupational History    Not on file   Tobacco Use    Smoking status: Never    Smokeless tobacco: Never   Vaping  Use    Vaping status: Never Used   Substance and Sexual Activity    Alcohol use: Never    Drug use: Never    Sexual activity: Yes     Partners: Male     Birth control/protection: I.U.D.   Other Topics Concern    Not on file   Social History Narrative    Not on file     Social Determinants of Health     Financial Resource Strain: Not on file   Food Insecurity: Not on file   Transportation Needs: Not on file   Physical Activity: Not on file   Stress: Not on file   Social Connections: Not on file   Intimate Partner Violence: Not on file   Housing Stability: Not on file       Past Surgical History:   Procedure Laterality Date     SECTION, LOW TRANSVERSE  2014    OTHER SURGICAL HISTORY  2022    Wrist surgery    OTHER SURGICAL HISTORY  2022     section          Review of Systems   GENERAL: Negative  GI: Negative  MUSCULOSKELETAL: See HPI  SKIN: Negative  NEURO:  Negative     Physical Exam:  side: right upper extremity:  Well-padded volar resting splint.  This was not removed.  Mild swelling noted to the dorsum of the hand  Patient able to flex and extend the MP and IP joints of her fingers.  Intact flexion and extension of 1st IP joint and finger abduction  Sensation intact to light touch medial / ulnar and radial nerve distribution   Good cap refill     Imaging  XR of the right wrist was reviewed in office today dated 2024.  There is an intra-articular volar shear fracture with approximately 2 mm of intra-articular widening.     Assessment   Patient with an acute side: right intra-articular distal radius fracture      Plan:  Based on the patient's fracture pattern I recommended surgical fixation of her right distal radius fracture.  This is an unstable fracture with a volar shear component which if displaces then may lead to a dislocated wrist.  She also has intra-articular gapping of over 2 mm which would likely benefit from surgical fixation.  With discussed surgical intervention  including pre-op eval, anesthesia, surgical plan including thre risks and benefits.  Discussed anticipated post-operative course and return to activities.     Follow-up  postop      For Surgical Planning:  Diagnosis: Right distal radius fracture  Procedure: ORIF right distal radius  CPT: 95618  Anesthesia: Regional block with LMA  Duration: 90 minutes  Special Equipment Needed: OralWise  Medical Notes / PM / DM / PAT needed?: no  Location: [facility] [place]  Initial Post Operative Visit: 2 weeks

## 2024-08-13 NOTE — PROGRESS NOTES
Patient ID: Tung Davis is a 38 y.o. female.    History of Present Illness  Patient presents today for evaluation of side: right upper extremity pain.    The patient sustained an acute injury on  2024 .  The patient fell while jogging.  She had acute pain to her right wrist.  She was seen in injury clinic and was found to have a right distal radius fracture which was intra-articular.  She was provided with a splint.  The patient denies any loss of consciousness or additional significant injuries.  The patient denies any current numbness or tingling.  The pain is sharp, acute in nature, better with rest worse with motion.    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     Past Medical History:   Diagnosis Date    Ganglion, right wrist 2022    Ganglion of right wrist    Other specified anxiety disorders 2022    Anxiety with depression       Medication Documentation Review Audit       Reviewed by Tasha Sharma MA (Medical Assistant) on 24 at 0757      Medication Order Taking? Sig Documenting Provider Last Dose Status   escitalopram (Lexapro) 10 mg tablet [id number]  Take 1.5 tablets (15 mg) by mouth once daily. Annemarie Nj MD   24 2359   levonorgestreL (Liletta) 20.4 mcg/24 hrs (8 yrs) 52 mg intrauterine device [id number]  by intrauterine route. Historical Provider, MD  Active   oxyCODONE (Roxicodone) 5 mg immediate release tablet [id number]  Take 1 tablet (5 mg) by mouth every 6 hours if needed for severe pain (7 - 10) for up to 3 days. Ally Mohamud MD   24 2359                    No Known Allergies    Social History     Socioeconomic History    Marital status:      Spouse name: Not on file    Number of children: Not on file    Years of education: Not on file    Highest education level: Not on file   Occupational History    Not on file   Tobacco Use    Smoking status: Never    Smokeless tobacco: Never   Vaping  Use    Vaping status: Never Used   Substance and Sexual Activity    Alcohol use: Never    Drug use: Never    Sexual activity: Yes     Partners: Male     Birth control/protection: I.U.D.   Other Topics Concern    Not on file   Social History Narrative    Not on file     Social Determinants of Health     Financial Resource Strain: Not on file   Food Insecurity: Not on file   Transportation Needs: Not on file   Physical Activity: Not on file   Stress: Not on file   Social Connections: Not on file   Intimate Partner Violence: Not on file   Housing Stability: Not on file       Past Surgical History:   Procedure Laterality Date     SECTION, LOW TRANSVERSE  2014    OTHER SURGICAL HISTORY  2022    Wrist surgery    OTHER SURGICAL HISTORY  2022     section          Review of Systems   GENERAL: Negative  GI: Negative  MUSCULOSKELETAL: See HPI  SKIN: Negative  NEURO:  Negative     Physical Exam:  side: right upper extremity:  Well-padded volar resting splint.  This was not removed.  Mild swelling noted to the dorsum of the hand  Patient able to flex and extend the MP and IP joints of her fingers.  Intact flexion and extension of 1st IP joint and finger abduction  Sensation intact to light touch medial / ulnar and radial nerve distribution   Good cap refill     Imaging  XR of the right wrist was reviewed in office today dated 2024.  There is an intra-articular volar shear fracture with approximately 2 mm of intra-articular widening.     Assessment   Patient with an acute side: right intra-articular distal radius fracture      Plan:  Based on the patient's fracture pattern I recommended surgical fixation of her right distal radius fracture.  This is an unstable fracture with a volar shear component which if displaces then may lead to a dislocated wrist.  She also has intra-articular gapping of over 2 mm which would likely benefit from surgical fixation.  With discussed surgical intervention  including pre-op eval, anesthesia, surgical plan including thre risks and benefits.  Discussed anticipated post-operative course and return to activities.     Follow-up  postop      For Surgical Planning:  Diagnosis: Right distal radius fracture  Procedure: ORIF right distal radius  CPT: 27077  Anesthesia: Regional block with LMA  Duration: 90 minutes  Special Equipment Needed: Familink  Medical Notes / PM / DM / PAT needed?: no  [place] [facility] [place] Post Operative Visit: 2 weeks

## 2024-08-13 NOTE — PROGRESS NOTES
CHIEF COMPLAINT         ***    ASSESSMENT + PLAN     ***        HISTORY OF PRESENT ILLNESS       Patient is a 38 y.o. ***-hand dominant female ***, who presents today for evaluation of ***      REVIEW OF SYSTEMS       A 30-item multi-system Review Of Systems was obtained on today's intake form.  This was reviewed with the patient and is correct.  The pertinent positives and negatives are listed above.  The form has been scanned separately into the medical record.      PHYSICAL EXAM    Constitutional:    Appears stated age. Well-developed and well-nourished ***.  Psychiatric:         Pleasant normal mood and affect. Behavior is appropriate for the situation.   Head:                   Normocephalic and atraumatic.  Eyes:                    Pupils are equal and round.  Cardiovascular:  2+ radial and ulnar pulses. Fingers well-perfused.  Respiratory:        Effort normal. No respiratory distress. Speaking in complete sentences.  Neurologic:       Alert and oriented to person, place, and time.  Skin:                Skin is intact, warm and dry.  Hematologic / Lymphatic:    No lymphedema or lymphangitis.    Extremities / Musculoskeletal:                      ***      IMAGING / LABS / EMGs           ***      Past Medical History:   Diagnosis Date    Ganglion, right wrist 2022    Ganglion of right wrist    Other specified anxiety disorders 2022    Anxiety with depression       Medication Documentation Review Audit       Reviewed by Tasha Sharma MA (Medical Assistant) on 24 at 0757      Medication Order Taking? Sig Documenting Provider Last Dose Status   escitalopram (Lexapro) 10 mg tablet [id number]  Take 1.5 tablets (15 mg) by mouth once daily. Annemarie Nj MD   24 2359   levonorgestreL (Liletta) 20.4 mcg/24 hrs (8 yrs) 52 mg intrauterine device [id number]  by intrauterine route. Historical Provider, MD  Active   oxyCODONE (Roxicodone) 5 mg immediate release tablet [id number]   Take 1 tablet (5 mg) by mouth every 6 hours if needed for severe pain (7 - 10) for up to 3 days. Ally Mohamud MD   24 9276                    No Known Allergies    Social History     Socioeconomic History    Marital status:      Spouse name: Not on file    Number of children: Not on file    Years of education: Not on file    Highest education level: Not on file   Occupational History    Not on file   Tobacco Use    Smoking status: Never    Smokeless tobacco: Never   Vaping Use    Vaping status: Never Used   Substance and Sexual Activity    Alcohol use: Never    Drug use: Never    Sexual activity: Yes     Partners: Male     Birth control/protection: I.U.D.   Other Topics Concern    Not on file   Social History Narrative    Not on file     Social Determinants of Health     Financial Resource Strain: Not on file   Food Insecurity: Not on file   Transportation Needs: Not on file   Physical Activity: Not on file   Stress: Not on file   Social Connections: Not on file   Intimate Partner Violence: Not on file   Housing Stability: Not on file       Past Surgical History:   Procedure Laterality Date     SECTION, LOW TRANSVERSE  2014    OTHER SURGICAL HISTORY  2022    Wrist surgery    OTHER SURGICAL HISTORY  2022     section         Electronically Signed      BEATRICE Gomez MD      Orthopaedic Hand Surgery      [phone]

## 2024-08-14 ENCOUNTER — CLINICAL SUPPORT (OUTPATIENT)
Dept: PREADMISSION TESTING | Facility: HOSPITAL | Age: 39
End: 2024-08-14
Payer: COMMERCIAL

## 2024-08-14 DIAGNOSIS — S52.601A CLOSED FRACTURE OF RIGHT DISTAL RADIUS AND ULNA, INITIAL ENCOUNTER: ICD-10-CM

## 2024-08-14 DIAGNOSIS — S52.501A CLOSED FRACTURE OF RIGHT DISTAL RADIUS AND ULNA, INITIAL ENCOUNTER: ICD-10-CM

## 2024-08-14 RX ORDER — ACETAMINOPHEN 500 MG
1000 TABLET ORAL EVERY 6 HOURS PRN
COMMUNITY
End: 2024-08-15 | Stop reason: HOSPADM

## 2024-08-14 NOTE — PERIOPERATIVE NURSING NOTE
PAT PRE-OPERATIVE INSTRUCTIONS    [facility]  [address] [place] [address]  [phone]    Please let your surgeon know if:      You develop:  Open sores, shingles, burning or painful urination as these may increase your risk of an infection.   Fever=100.4 or greater   New or worsening cold or flu symptoms ( cough, shortness of breath, sore throat, respiratory distress, headache, fatigue, GI symptoms)   You no longer wish to have the surgery.   Any other personal circumstances change that may lead to the need to cancel or defer this surgery-such as being sick or getting admitted to any hospital within one week of your planned procedure.    Your contact details change, such as a change of address or phone number.    Starting now:     Please DO NOT drink alcohol or smoke for 24 hours before surgery. It is well known that quitting smoking can make a huge difference to your health and recovery from surgery. The longer you abstain from smoking, the better your chances of a healthy recovery. If you need help with quitting, call 1-800-QUIT-NOW to be connected to a trained counselor who will discuss the best methods to help you quit.     Before your surgery:    Please stop all supplements/ vitamins 7 days prior to surgery (or as directed by your surgeon).   Please stop taking NSAID pain medicine such as Advil, Ibuprofen, and Motrin 7 days before surgery.    If you develop any fever, cough, cold, rashes, cuts, scratches, scrapes, urinary symptoms or infection anywhere on your body (including teeth and gums) prior to surgery, please call your surgeon’s office as soon as possible. This may require treatment to reduce the chance of cancellation on the day of surgery.    The day before your surgery:   DIET- Do not eat any food after MIDNIGHT.   Get a good night’s rest.  Use the special soap for bathing if you have been instructed to use one.    Scheduled surgery times may change  and you will be notified if this occurs - please check your personal voicemail for any updates.     On the morning of surgery:   Wear comfortable, loose fitting clothes which open in the front.   Shower and please do not wear moisturizers, creams, lotions, deodorants, makeup or perfume.    Please bring with you to surgery:   Photo ID and insurance card   Current list of medicines and allergies   Pacemaker/ Defibrillator/Heart stent cards as well as remote controls for implanted devices    CPAP machine and mask    Slings/ splints/ crutches   A copy of your complete advanced directive/DHPOA.    Please do NOT bring with you to surgery:   All jewelry and valuables should be left at home.   Prosthetic devices such as contact lenses, glasses, hearing aids, dentures, eyelash extensions, hairpins and body piercings must be removed prior to going in to the surgical suite. If you have a case for these items, please bring it with you on surgery day.    *Patients under the age of 18: A responsible adult must be present and remaining in the building throughout the surgical visit.    After outpatient surgery:   A responsible adult MUST accompany you at the time of discharge and stay with you for 24 hours after your surgery. You may NOT drive yourself home after surgery.    Do not drive, operate machinery, make critical decisions or do activities that require co-ordination or balance until after a night’s sleep.   Do not drink alcoholic beverages for 24 hours.   Instructions for resuming your medications will be provided by your surgeon.    CALL YOUR DOCTOR AFTER SURGERY IF YOU HAVE:     Chills and/or a fever of 101° F or higher.    Redness, swelling, pus or drainage from your surgical wound or a bad smell from the wound.    Lightheadedness, fainting or confusion.    Persistent vomiting (throwing up) and are not able to eat or drink for 12 hours.    Three or more loose, watery bowel movements in 24 hours (diarrhea).   Difficulty  or pain while urinating( after non-urological surgery)    Pain and swelling in your legs, especially if it is only on one side.    Difficulty breathing or are breathing faster than normal.    Any new concerning symptoms.      Reviewed pre-op instructions with patient, states understanding and denies further questions at this time.      Take Care Ibrahimat

## 2024-08-15 ENCOUNTER — APPOINTMENT (OUTPATIENT)
Dept: RADIOLOGY | Facility: HOSPITAL | Age: 39
End: 2024-08-15
Payer: COMMERCIAL

## 2024-08-15 ENCOUNTER — HOSPITAL ENCOUNTER (OUTPATIENT)
Facility: HOSPITAL | Age: 39
Setting detail: OUTPATIENT SURGERY
Discharge: HOME | End: 2024-08-15
Attending: STUDENT IN AN ORGANIZED HEALTH CARE EDUCATION/TRAINING PROGRAM | Admitting: STUDENT IN AN ORGANIZED HEALTH CARE EDUCATION/TRAINING PROGRAM
Payer: COMMERCIAL

## 2024-08-15 ENCOUNTER — ANESTHESIA EVENT (OUTPATIENT)
Dept: OPERATING ROOM | Facility: HOSPITAL | Age: 39
End: 2024-08-15
Payer: COMMERCIAL

## 2024-08-15 ENCOUNTER — ANESTHESIA (OUTPATIENT)
Dept: OPERATING ROOM | Facility: HOSPITAL | Age: 39
End: 2024-08-15
Payer: COMMERCIAL

## 2024-08-15 VITALS
SYSTOLIC BLOOD PRESSURE: 131 MMHG | RESPIRATION RATE: 16 BRPM | WEIGHT: 181 LBS | BODY MASS INDEX: 33.31 KG/M2 | TEMPERATURE: 97.2 F | HEIGHT: 62 IN | DIASTOLIC BLOOD PRESSURE: 74 MMHG | OXYGEN SATURATION: 99 % | HEART RATE: 80 BPM

## 2024-08-15 DIAGNOSIS — G89.18 POSTOPERATIVE PAIN: Primary | ICD-10-CM

## 2024-08-15 DIAGNOSIS — Z98.890 POSTOPERATIVE NAUSEA: ICD-10-CM

## 2024-08-15 DIAGNOSIS — R11.0 POSTOPERATIVE NAUSEA: ICD-10-CM

## 2024-08-15 DIAGNOSIS — S52.501A CLOSED FRACTURE OF RIGHT DISTAL RADIUS AND ULNA, INITIAL ENCOUNTER: ICD-10-CM

## 2024-08-15 DIAGNOSIS — S52.601A CLOSED FRACTURE OF RIGHT DISTAL RADIUS AND ULNA, INITIAL ENCOUNTER: ICD-10-CM

## 2024-08-15 LAB — HCG UR QL IA.RAPID: NEGATIVE

## 2024-08-15 PROCEDURE — 25609 OPTX DST RD XART FX/EP SEP3+: CPT | Performed by: STUDENT IN AN ORGANIZED HEALTH CARE EDUCATION/TRAINING PROGRAM

## 2024-08-15 PROCEDURE — 81025 URINE PREGNANCY TEST: CPT | Performed by: STUDENT IN AN ORGANIZED HEALTH CARE EDUCATION/TRAINING PROGRAM

## 2024-08-15 PROCEDURE — 2500000004 HC RX 250 GENERAL PHARMACY W/ HCPCS (ALT 636 FOR OP/ED): Mod: JZ | Performed by: STUDENT IN AN ORGANIZED HEALTH CARE EDUCATION/TRAINING PROGRAM

## 2024-08-15 PROCEDURE — 2720000007 HC OR 272 NO HCPCS: Performed by: STUDENT IN AN ORGANIZED HEALTH CARE EDUCATION/TRAINING PROGRAM

## 2024-08-15 PROCEDURE — C1769 GUIDE WIRE: HCPCS | Performed by: STUDENT IN AN ORGANIZED HEALTH CARE EDUCATION/TRAINING PROGRAM

## 2024-08-15 PROCEDURE — 3700000001 HC GENERAL ANESTHESIA TIME - INITIAL BASE CHARGE: Performed by: STUDENT IN AN ORGANIZED HEALTH CARE EDUCATION/TRAINING PROGRAM

## 2024-08-15 PROCEDURE — 7100000002 HC RECOVERY ROOM TIME - EACH INCREMENTAL 1 MINUTE: Performed by: STUDENT IN AN ORGANIZED HEALTH CARE EDUCATION/TRAINING PROGRAM

## 2024-08-15 PROCEDURE — 7100000010 HC PHASE TWO TIME - EACH INCREMENTAL 1 MINUTE: Performed by: STUDENT IN AN ORGANIZED HEALTH CARE EDUCATION/TRAINING PROGRAM

## 2024-08-15 PROCEDURE — 2500000004 HC RX 250 GENERAL PHARMACY W/ HCPCS (ALT 636 FOR OP/ED): Performed by: STUDENT IN AN ORGANIZED HEALTH CARE EDUCATION/TRAINING PROGRAM

## 2024-08-15 PROCEDURE — 3600000009 HC OR TIME - EACH INCREMENTAL 1 MINUTE - PROCEDURE LEVEL FOUR: Performed by: STUDENT IN AN ORGANIZED HEALTH CARE EDUCATION/TRAINING PROGRAM

## 2024-08-15 PROCEDURE — 7100000001 HC RECOVERY ROOM TIME - INITIAL BASE CHARGE: Performed by: STUDENT IN AN ORGANIZED HEALTH CARE EDUCATION/TRAINING PROGRAM

## 2024-08-15 PROCEDURE — 3600000004 HC OR TIME - INITIAL BASE CHARGE - PROCEDURE LEVEL FOUR: Performed by: STUDENT IN AN ORGANIZED HEALTH CARE EDUCATION/TRAINING PROGRAM

## 2024-08-15 PROCEDURE — 2780000003 HC OR 278 NO HCPCS: Performed by: STUDENT IN AN ORGANIZED HEALTH CARE EDUCATION/TRAINING PROGRAM

## 2024-08-15 PROCEDURE — 2500000004 HC RX 250 GENERAL PHARMACY W/ HCPCS (ALT 636 FOR OP/ED): Performed by: ANESTHESIOLOGIST ASSISTANT

## 2024-08-15 PROCEDURE — C1713 ANCHOR/SCREW BN/BN,TIS/BN: HCPCS | Performed by: STUDENT IN AN ORGANIZED HEALTH CARE EDUCATION/TRAINING PROGRAM

## 2024-08-15 PROCEDURE — 2500000005 HC RX 250 GENERAL PHARMACY W/O HCPCS: Performed by: STUDENT IN AN ORGANIZED HEALTH CARE EDUCATION/TRAINING PROGRAM

## 2024-08-15 PROCEDURE — 3700000002 HC GENERAL ANESTHESIA TIME - EACH INCREMENTAL 1 MINUTE: Performed by: STUDENT IN AN ORGANIZED HEALTH CARE EDUCATION/TRAINING PROGRAM

## 2024-08-15 PROCEDURE — 7100000009 HC PHASE TWO TIME - INITIAL BASE CHARGE: Performed by: STUDENT IN AN ORGANIZED HEALTH CARE EDUCATION/TRAINING PROGRAM

## 2024-08-15 DEVICE — IMPLANTABLE DEVICE: Type: IMPLANTABLE DEVICE | Site: WRIST | Status: FUNCTIONAL

## 2024-08-15 RX ORDER — FENTANYL CITRATE 50 UG/ML
INJECTION, SOLUTION INTRAMUSCULAR; INTRAVENOUS AS NEEDED
Status: DISCONTINUED | OUTPATIENT
Start: 2024-08-15 | End: 2024-08-15

## 2024-08-15 RX ORDER — IPRATROPIUM BROMIDE 0.5 MG/2.5ML
500 SOLUTION RESPIRATORY (INHALATION) AS NEEDED
Status: DISCONTINUED | OUTPATIENT
Start: 2024-08-15 | End: 2024-08-15 | Stop reason: HOSPADM

## 2024-08-15 RX ORDER — ALBUTEROL SULFATE 0.83 MG/ML
2.5 SOLUTION RESPIRATORY (INHALATION) ONCE AS NEEDED
Status: DISCONTINUED | OUTPATIENT
Start: 2024-08-15 | End: 2024-08-15 | Stop reason: HOSPADM

## 2024-08-15 RX ORDER — ONDANSETRON HYDROCHLORIDE 2 MG/ML
4 INJECTION, SOLUTION INTRAVENOUS ONCE AS NEEDED
Status: COMPLETED | OUTPATIENT
Start: 2024-08-15 | End: 2024-08-15

## 2024-08-15 RX ORDER — FENTANYL CITRATE 50 UG/ML
50 INJECTION, SOLUTION INTRAMUSCULAR; INTRAVENOUS EVERY 5 MIN PRN
Status: DISCONTINUED | OUTPATIENT
Start: 2024-08-15 | End: 2024-08-15 | Stop reason: HOSPADM

## 2024-08-15 RX ORDER — GABAPENTIN 300 MG/1
300 CAPSULE ORAL AS NEEDED
COMMUNITY

## 2024-08-15 RX ORDER — CEFAZOLIN SODIUM 2 G/100ML
2 INJECTION, SOLUTION INTRAVENOUS ONCE
Status: COMPLETED | OUTPATIENT
Start: 2024-08-15 | End: 2024-08-15

## 2024-08-15 RX ORDER — MIDAZOLAM HYDROCHLORIDE 1 MG/ML
2 INJECTION, SOLUTION INTRAMUSCULAR; INTRAVENOUS ONCE
Status: COMPLETED | OUTPATIENT
Start: 2024-08-15 | End: 2024-08-15

## 2024-08-15 RX ORDER — OXYCODONE AND ACETAMINOPHEN 5; 325 MG/1; MG/1
1 TABLET ORAL EVERY 6 HOURS PRN
Qty: 28 TABLET | Refills: 0 | Status: SHIPPED | OUTPATIENT
Start: 2024-08-15

## 2024-08-15 RX ORDER — ONDANSETRON 4 MG/1
8 TABLET, FILM COATED ORAL EVERY 8 HOURS PRN
Qty: 28 TABLET | Refills: 0 | Status: SHIPPED | OUTPATIENT
Start: 2024-08-15

## 2024-08-15 RX ORDER — FENTANYL CITRATE 50 UG/ML
25 INJECTION, SOLUTION INTRAMUSCULAR; INTRAVENOUS EVERY 5 MIN PRN
Status: DISCONTINUED | OUTPATIENT
Start: 2024-08-15 | End: 2024-08-15 | Stop reason: HOSPADM

## 2024-08-15 RX ORDER — SODIUM CHLORIDE, SODIUM LACTATE, POTASSIUM CHLORIDE, CALCIUM CHLORIDE 600; 310; 30; 20 MG/100ML; MG/100ML; MG/100ML; MG/100ML
100 INJECTION, SOLUTION INTRAVENOUS CONTINUOUS
Status: DISCONTINUED | OUTPATIENT
Start: 2024-08-15 | End: 2024-08-15 | Stop reason: HOSPADM

## 2024-08-15 RX ORDER — ONDANSETRON HYDROCHLORIDE 2 MG/ML
INJECTION, SOLUTION INTRAVENOUS AS NEEDED
Status: DISCONTINUED | OUTPATIENT
Start: 2024-08-15 | End: 2024-08-15

## 2024-08-15 RX ORDER — LIDOCAINE HYDROCHLORIDE 10 MG/ML
INJECTION INFILTRATION; PERINEURAL AS NEEDED
Status: DISCONTINUED | OUTPATIENT
Start: 2024-08-15 | End: 2024-08-15 | Stop reason: HOSPADM

## 2024-08-15 RX ORDER — PROCHLORPERAZINE EDISYLATE 5 MG/ML
5 INJECTION INTRAMUSCULAR; INTRAVENOUS ONCE AS NEEDED
Status: DISCONTINUED | OUTPATIENT
Start: 2024-08-15 | End: 2024-08-15 | Stop reason: HOSPADM

## 2024-08-15 RX ORDER — PROPOFOL 10 MG/ML
INJECTION, EMULSION INTRAVENOUS AS NEEDED
Status: DISCONTINUED | OUTPATIENT
Start: 2024-08-15 | End: 2024-08-15

## 2024-08-15 RX ORDER — BUPIVACAINE HYDROCHLORIDE 5 MG/ML
INJECTION, SOLUTION PERINEURAL AS NEEDED
Status: DISCONTINUED | OUTPATIENT
Start: 2024-08-15 | End: 2024-08-15 | Stop reason: HOSPADM

## 2024-08-15 RX ORDER — LABETALOL HYDROCHLORIDE 5 MG/ML
5 INJECTION, SOLUTION INTRAVENOUS ONCE AS NEEDED
Status: DISCONTINUED | OUTPATIENT
Start: 2024-08-15 | End: 2024-08-15 | Stop reason: HOSPADM

## 2024-08-15 RX ORDER — HYDRALAZINE HYDROCHLORIDE 20 MG/ML
5 INJECTION INTRAMUSCULAR; INTRAVENOUS EVERY 30 MIN PRN
Status: DISCONTINUED | OUTPATIENT
Start: 2024-08-15 | End: 2024-08-15 | Stop reason: HOSPADM

## 2024-08-15 RX ORDER — DIPHENHYDRAMINE HYDROCHLORIDE 50 MG/ML
12.5 INJECTION INTRAMUSCULAR; INTRAVENOUS ONCE AS NEEDED
Status: DISCONTINUED | OUTPATIENT
Start: 2024-08-15 | End: 2024-08-15 | Stop reason: HOSPADM

## 2024-08-15 RX ORDER — LIDOCAINE HYDROCHLORIDE 10 MG/ML
INJECTION, SOLUTION INTRAVENOUS AS NEEDED
Status: DISCONTINUED | OUTPATIENT
Start: 2024-08-15 | End: 2024-08-15

## 2024-08-15 RX ORDER — FENTANYL CITRATE 50 UG/ML
50 INJECTION, SOLUTION INTRAMUSCULAR; INTRAVENOUS ONCE
Status: COMPLETED | OUTPATIENT
Start: 2024-08-15 | End: 2024-08-15

## 2024-08-15 RX ORDER — MEPERIDINE HYDROCHLORIDE 25 MG/ML
12.5 INJECTION INTRAMUSCULAR; INTRAVENOUS; SUBCUTANEOUS EVERY 10 MIN PRN
Status: DISCONTINUED | OUTPATIENT
Start: 2024-08-15 | End: 2024-08-15 | Stop reason: HOSPADM

## 2024-08-15 ASSESSMENT — PAIN - FUNCTIONAL ASSESSMENT
PAIN_FUNCTIONAL_ASSESSMENT: 0-10

## 2024-08-15 ASSESSMENT — PAIN SCALES - GENERAL
PAINLEVEL_OUTOF10: 0 - NO PAIN
PAINLEVEL_OUTOF10: 2
PAINLEVEL_OUTOF10: 0 - NO PAIN
PAINLEVEL_OUTOF10: 0 - NO PAIN

## 2024-08-15 ASSESSMENT — COLUMBIA-SUICIDE SEVERITY RATING SCALE - C-SSRS
6. HAVE YOU EVER DONE ANYTHING, STARTED TO DO ANYTHING, OR PREPARED TO DO ANYTHING TO END YOUR LIFE?: NO
2. HAVE YOU ACTUALLY HAD ANY THOUGHTS OF KILLING YOURSELF?: NO
1. IN THE PAST MONTH, HAVE YOU WISHED YOU WERE DEAD OR WISHED YOU COULD GO TO SLEEP AND NOT WAKE UP?: NO

## 2024-08-15 NOTE — ANESTHESIA PREPROCEDURE EVALUATION
Patient: Tung Davis    Procedure Information       Date/Time: 08/15/24 1115    Procedure: ORIF right distal radius /  90 minutes ( Synthes Distal Radius Set ) (Right: Wrist)    Location: [facility] / [facility]    Surgeons: Will B Beucler, DO            Relevant Problems   Anesthesia (within normal limits)      Cardiac   (-) History of coronary artery bypass graft   (-) Pacemaker      Pulmonary   (-) Asthma (HHS-HCC)   (-) Chronic obstructive pulmonary disease (Multi)   (-) CRISTIANA (obstructive sleep apnea)      Neuro   (+) Depression with anxiety   (-) CVA (cerebral vascular accident) (Multi)   (-) Seizures (Multi)      Endocrine   (-) Diabetes mellitus, type 2 (Multi)      Hematology   (-) Coagulopathy (Multi)       Clinical information reviewed:   Tobacco  Allergies  Meds  Problems  Med Hx  Surg Hx  OB Status    Fam Hx  Soc Hx        NPO Detail:  NPO/Void Status  Date of Last Liquid: 08/14/24  Time of Last Liquid: 2100  Date of Last Solid: 08/14/24  Time of Last Solid: 2030  Time of Last Void: 1035         Physical Exam    Airway  Mallampati: I  TM distance: >3 FB  Neck ROM: full     Cardiovascular    Dental    Pulmonary    Abdominal            Anesthesia Plan    History of general anesthesia?: yes  History of complications of general anesthesia?: no    ASA 1     general and regional     intravenous induction   Postoperative administration of opioids is intended.  Anesthetic plan and risks discussed with patient.

## 2024-08-15 NOTE — ANESTHESIA PROCEDURE NOTES
Airway  Date/Time: 8/15/2024 12:42 PM  Urgency: elective    Airway not difficult    Staffing  Performed: BG   Authorized by: Thomas Clark MD    Performed by: BG Cancino  Patient location during procedure: OR    Indications and Patient Condition  Indications for airway management: anesthesia  Spontaneous Ventilation: absent  Sedation level: deep  Preoxygenated: yes  Patient position: sniffing  Mask difficulty assessment: 0 - not attempted    Final Airway Details  Final airway type: supraglottic airway      Successful airway: Size 4     Number of attempts at approach: 1    Additional Comments  EASY, ATRAUMATIC LMA PLACEMENT TO GOOD SEAL  SOFT BITE BLOCK PLACED

## 2024-08-15 NOTE — ANESTHESIA PROCEDURE NOTES
Peripheral Block    Patient location during procedure: holding area  Start time: 8/15/2024 12:06 PM  End time: 8/15/2024 12:09 PM  Reason for block: at surgeon's request and post-op pain management  Staffing  Performed: attending   Authorized by: Thomas Clark MD    Performed by: Thomas Clark MD  Preanesthetic Checklist  Completed: patient identified, IV checked, site marked, risks and benefits discussed, surgical consent, monitors and equipment checked, pre-op evaluation and timeout performed   Timeout performed at: 8/15/2024 11:58 AM  Peripheral Block  Patient position: sitting  Prep: ChloraPrep  Patient monitoring: heart rate, cardiac monitor and continuous pulse ox  Block type: supraclavicular  Injection technique: single-shot  Guidance: ultrasound guided  Local infiltration: ropivacaine  Infiltration strength: 0.5 %  Dose: 20 mL  Needle  Needle gauge: 20 G  Needle length: 5 cm  Needle localization: ultrasound guidance  Assessment  Injection assessment: negative aspiration for heme, no paresthesia on injection, incremental injection and local visualized surrounding nerve on ultrasound  Heart rate change: no  Slow fractionated injection: no

## 2024-08-15 NOTE — DISCHARGE INSTRUCTIONS
Post-Operative Instructions   Dr. Natalio Deyucler   [phone]     Dressing: You have a splint covering your operative site:    Do not remove the dressing until your next scheduled appointment with your surgeon or therapist. Keep your dressing clean and dry. The dressing will be removed at that appointment     Showering: You may shower following surgery but please adhere to above instructions regarding the dressing. If showering with bandage / splint in place please ensure that it is kept dry and covered while bathing. There are commercially available cast bags that can be used to protect your dressing while showering. If using garbage bags please make sure that there are no holes in the bag and that the bag has been sealed above the dressing. If the bandage gets wet you must contact your surgeon's office to make arrangements to be seen to have bandage changed.    Ice / Elevation: The application of ice to your surgical site after surgery will help with pain control and swelling. This can be very effective for 48-72 hours after surgery. Please be careful to avoid getting bandage wet from a leaky ice bag. Please be advised that the ice may need to be applied for longer periods of time for the cooling effect to penetrate the post-operative dressing. Elevation of the operative site above the level of the heart as much as possible for the first 48-72 hours after surgery. Use your sling if you have been provided one while standing or walking. If your fingers are not included in the dressing you are encouraged to attempt finger range of motion as this will help with your hand swelling and ultimate recovery.     Pain Medication: If you received a regional anesthetic on the day of your surgery your arm and hand may be numb for up to 24 hours after surgery. It is important to wear your sling while the block is still effective in order to protect your arm. It is advisable to take pain medications prior to going to sleep in  case the regional anesthesia medication wears off while you are sleeping. Take your pain medications as directed. Narcotic pain medications can cause lethargy, nausea and constipation. Please contact your surgeon's office and discontinue the medication if these symptoms  become problematic. Eating a regular diet, drinking fluids and walking can  help with constipation from these medications.   Avoid alcohol consumption     Additional pain control options:    Do not take anti-inflammatory medications like Advil / Motrin / Ibuprofen / Aleve after your surgery. Tylenol / acetaminophen is ok to lake as long as you are not taking in addition to your prescription pain medications that also contain acetaminophen     Smoking / Tobacco: Tobacco use is known to interfere with wound and fracture healing and increase post-operative pain. It can also increase your risk of poor outcomes following surgery. Please do not use tobacco or nicotine products following your surgery. This includes smokeless tobacco, or nicotine replacement products (patches, gum, etc.)     Driving: It is not advisable to operate a vehicle while using narcotic pain medications. Additionally, please be advised that you are likely to have challenges operating a car or motorcycle while you are still in your postoperative dressing and this could increase your risk of being involved in an accident and being cited for driving while physically impaired.     Warning Signs: Observe your arm / hand and incision site (if visible) for increased redness, inflammation, drainage, odor or pain that is unrelieved by rest, elevation or medication. Please contact your surgeon's office immediately if you develop any of these issues or if you develop a fever greater than 101°.     Follow Up Appointments:     You do not yet have a post-operative appointment scheduled. Please contact Dr. Ivan's office at [phone] to schedule this appointment.

## 2024-08-15 NOTE — PERIOPERATIVE NURSING NOTE
Patient states feeling less nauseous since IV zofran dose given.     Denies pain at this time. VS stable.

## 2024-08-15 NOTE — ANESTHESIA POSTPROCEDURE EVALUATION
Patient: Tung Davis    Procedure Summary       Date: 08/15/24 Room / Location: [facility]    Anesthesia Start: 1234 Anesthesia Stop: 1510    Procedure: ORIF right distal radius (Right: Wrist) Diagnosis:       Closed fracture of right distal radius and ulna, initial encounter      (Closed fracture of right distal radius and ulna, initial encounter [S52.501A, S52.601A])    Surgeons: Natalio Ivan DO Responsible Provider: Thomas Clark MD    Anesthesia Type: general, regional ASA Status: 1            Anesthesia Type: general, regional    Vitals Value Taken Time   /87 08/15/24 1605   Temp 36 °C (96.8 °F) 08/15/24 1507   Pulse 85 08/15/24 1605   Resp 18 08/15/24 1605   SpO2 99 % 08/15/24 1605       Anesthesia Post Evaluation    Patient location during evaluation: PACU  Patient participation: complete - patient participated  Level of consciousness: awake  Pain management: adequate  Multimodal analgesia pain management approach  Airway patency: patent  Cardiovascular status: acceptable and hemodynamically stable  Respiratory status: acceptable and spontaneous ventilation  Hydration status: euvolemic  Postoperative Nausea and Vomiting: none  Comments: No nausea or vomiting        There were no known notable events for this encounter.

## 2024-08-15 NOTE — PERIOPERATIVE NURSING NOTE
Patient alert and oriented. Surgical site clean, dry and intact. Patient denies pain at this time. VS stable.     Dr. Ivan at bedside to speak with patient.

## 2024-08-15 NOTE — BRIEF OP NOTE
Date: 8/15/2024  OR Location: [facility]    Name: Tung Davis, : 1985, Age: 38 y.o., MRN: [id number], Sex: female    Diagnosis  Pre-op Diagnosis      * Closed fracture of right distal radius and ulna, initial encounter [S52.501A, S52.601A] Post-op Diagnosis     * Closed fracture of right distal radius and ulna, initial encounter [S52.501A, S52.601A]     Procedures  ORIF right distal radius  49279 - RI OPTX DSTL RADL I-ARTIC FX/EPIPHYSL SEP 2 FRAG      Surgeons      * Will B Beucler - Primary    Resident/Fellow/Other Assistant:  Vaishali Blackmon PGY-4    Procedure Summary  Anesthesia: Regional, General  ASA: I  Anesthesia Staff: Anesthesiologist: Thomas Clark MD  C-AA: BG Cancino  Estimated Blood Loss: 5 mL  Intra-op Medications:   Administrations occurring from 1115 to 1315 on 08/15/24:   Medication Name Total Dose   lactated Ringer's infusion 175 mL   ceFAZolin (Ancef) 2 g in dextrose (iso)  mL 2 g   fentaNYL PF (Sublimaze) injection 50 mcg 50 mcg   midazolam (Versed) injection 2 mg 2 mg              Anesthesia Record               Intraprocedure I/O Totals          Intake    lactated Ringer's infusion 1300.00 mL    ceFAZolin (Ancef) 2 g in dextrose (iso)  mL 100.00 mL    Total Intake 1400 mL       Output    Est. Blood Loss 5 mL    Total Output 5 mL       Net    Net Volume 1395 mL          Specimen: No specimens collected     Staff:   Circulator: Estephanie  Circulator: Leanne  Scrub Person: Maxine  Scrub Person: Jennifer  Scrub Person: Juno Van Scrub: Connie Van Circulator: Leda      Findings: Intra-articular distal radius fracture with volar shearing    Complications:  None; patient tolerated the procedure well.     Disposition: PACU - hemodynamically stable.  Condition: stable  Specimens Collected: No specimens collected  Attending Attestation: I was present and scrubbed for the entire procedure.    Will GERARDO Deyucler  Phone Number: [phone]

## 2024-08-22 NOTE — OP NOTE
ORIF right distal radius (R) Operative Note     Date: 8/15/2024  OR Location: [facility]    Name: Tung Davis, : 1985, Age: 38 y.o., MRN: [id number], Sex: female    Diagnosis  Pre-op Diagnosis      * Closed fracture of right distal radius and ulna, initial encounter [S52.501A, S52.601A] Post-op Diagnosis     * Closed fracture of right distal radius and ulna, initial encounter [S52.501A, S52.601A]     Procedures  ORIF right distal radius  82101 - TX OPTX DSTL RADL I-ARTIC FX/EPIPHYSL SEP 2 FRAG      Surgeons      * Will B Beucler - Primary    Resident/Fellow/Other Assistant:  Surgeons and Role:  * No surgeons found with a matching role *    Procedure Summary  Anesthesia: Regional, General  ASA: I  Anesthesia Staff: Anesthesiologist: Thomas Clark MD  C-AA: BG Cancino  Estimated Blood Loss: 10mL  Intra-op Medications:   Administrations occurring from 1115 to 1315 on 08/15/24:   Medication Name Total Dose   ceFAZolin (Ancef) 2 g in dextrose (iso)  mL 2 g   fentaNYL PF (Sublimaze) injection 50 mcg 50 mcg   midazolam (Versed) injection 2 mg 2 mg   lactated Ringer's infusion 175 mL              Anesthesia Record               Intraprocedure I/O Totals          Intake    lactated Ringer's infusion 1300.00 mL    ceFAZolin (Ancef) 2 g in dextrose (iso)  mL 100.00 mL    Total Intake 1400 mL       Output    Est. Blood Loss 5 mL    Total Output 5 mL       Net    Net Volume 1395 mL          Specimen: No specimens collected     Staff:   Circulator: Estephanie  Circulator: Leanne  Scrub Person: Maxine  Scrub Person: Jennifer  Scrub Person: Juno Van Scrub: Connie Van Circulator: Leda         Drains and/or Catheters: * None in log *    Tourniquet Times:     Total Tourniquet Time Documented:  Arm - Upper (Right) - 113 minutes  Total: Arm - Upper (Right) - 113 minutes      Implants:  Implants       Type Name Action Serial No.      Screw PLATE, VOLAR DISTAL RADIUS, 3H, STANDARD, RT - [id number] Implanted       Screw SCREW, 3.5 X 13 LP TI - [id number] Implanted      Screw SCREW, LOCKING, VA, LOW PROFILE, 2.4 X 18MM - [id number] Implanted      Screw SCREW LOCKING, VA, LOW PROFILE, 2.4 X 16MM - [id number] Implanted      Screw SCREW, 3.5 X 16 LP TI - [id number] Wasted      Screw SCREW, LOCK 3.5 X 16 TI - [id number] Implanted      Screw SCREW LOCKING, VA, LOW PROFILE, 2.4 X 14MM - [id number] Implanted      Screw 2.4X16 KREULOCK SCREW Implanted               Findings: Intra-articular distal radius fracture with greater than 3 fragments.  This is a highly unstable fracture with volar shear components.    Indications: Tung Davis is an 38 y.o. female who is having surgery for Closed fracture of right distal radius and ulna, initial encounter [S52.015A, S52.411A].  Comminuted displaced intra-articular fracture which is unstable.    The patient was seen in the preoperative area. The risks, benefits, complications, treatment options, non-operative alternatives, expected recovery and outcomes were discussed with the patient. The possibilities of reaction to medication, pulmonary aspiration, injury to surrounding structures, bleeding, recurrent infection, the need for additional procedures, failure to diagnose a condition, and creating a complication requiring transfusion or operation were discussed with the patient. The patient concurred with the proposed plan, giving informed consent.  The site of surgery was properly noted/marked if necessary per policy. The patient has been actively warmed in preoperative area. Preoperative antibiotics have been ordered and given within 1 hours of incision. Venous thrombosis prophylaxis are not indicated.    Procedure Details: Indications for procedure:     I met and evaluated the patient in the preoperative holding area today prior to the patient receiving any sedation or other medications which may alter their mental status. I confirmed their identity via the facility's protocol. I reviewed  the patient's history, physical exam, and recommendation for surgery. The indications for surgical versus nonsurgical management of the condition were discussed, including the inherent risks, benefits, prognosis of the condition.  The risks of surgery include, but are not limited to, pain, bleeding, infection, tendon damage, nerve damage, vessel damage, and need for further surgery.  The risks also include wound healing problems, decreased long-term functionality of the wrist and hand, tendon irritation, tendon rupture, and possible need for therapy for an optimum outcome.  There is a risk of complex regional pain syndrome, incomplete recovery, incomplete relief or worsening of symptoms, and recurrence.  We also discussed that medical complications are possible during and after surgery related to either anesthesia or the surgical procedure itself. Specific discussion of the risks of the proposed anesthetic plan will be discussed by the patient's anesthesia provider. All risks were reviewed in layman´s terms. The patient was given ample time to ask appropriate questions and appeared to be satisfied with the answers provided. All questions were answered and no guarantees have been given regarding the patient's condition and treatment recommendations. The general plan for the postoperative rehabilitation course, including possible need for therapy, was reviewed at great length. Informed consent was signed by all appropriate parties. The surgical site was marked in ink by myself.        Procedure in Detail:  The patient was transported to the operating room and placed in the supine position on the operating table. All extremities and prominences were appropriately padded. Adequate anesthesia was induced. A non-sterile tourniquet was applied high on the right arm. The right arm was prepped and draped in standard sterile fashion. A time-out was conducted prior to beginning the operation to confirm the patient's identity,  planned procedures, laterality of procedures, and that appropriate antibiotics had been administered within 30 minutes of incision. The right upper extremity was exsanguinated with an Esmarch bandage, and the tourniquet was inflated to 250mmHg.     A volar FCR approach was used for surgical exposure. A 8cm incision was made beginning at the volar wrist crease continuing proximally along the FCR tendon.  Full thickness soft tissue flaps were elevated from the forearm fascia overlying the FCR tendon. Care was taken during dissection to preserve and protect the palmar cutaneous branch of the median nerve and the radial artery. The FCR tendon sheath was opened longitudinally from the volar wrist crease to approximately 10 centimeters proximal to this. The FCR tendon was carefully retracted ulnarly. The FPL musculotendinous unit was identified, septal insertions to the radial shaft were carefully released, and the tendon was retracted ulnarly. The pronator quadratus was identified and released from the radius via an L-shaped incision running longitudinally along the radial aspect of the radius and transversely just proximal to the origin of the volar extrinsic radiocarpal ligaments.  The pronator quadratus was elevated as a single flap and used to cushion any gentle retraction against the carpal canal contents. The fracture site was identified and debrided of interposed pronator muscle and hematoma.     A brachioradialis tenotomy was performed under direct visualization, carefully protecting the 1st dorsal extensor compartment tendons, radial artery, and superficial branch of the radial nerve.     Fracture reduction was facilitated with gentle traction and fracture site manipulation in order to restore the volar cortex, articular segments including both the radiocarpal and distal radial ulnar joints, and acceptable volar tilt.  The fracture site was manipulated with a dental pick to align the fracture fragments.  This  "was confirmed by fracture line interdigitation.  Several K wires were placed for interfragmentary fixation of the fracture fragments.  These were placed from the volar lip in a retrograde fashion to hold the volar shear component in place.  K wires were also placed from a volar to dorsal manner.  Intraoperative C-arm fluoroscopy was utilized to confirm acceptable fracture reduction in multiple planes.  Acceptable radial height, radial inclination, volar tilt, and articular reduction were confirmed on orthogonal fluoroscopic views.     A volar locking distal radius plating system was used for definitive fracture stabilization. The plate was first centered over the distal aspect of the radial diaphysis and stabilized using an appropriately drilled, sized, and inserted bicortical non-locking screw. Next the distal portion of the plate was utilized to aid in fracture reduction and stabilization, and was provisionally fixed distally using three unicortical 0.054\" Barak wires inserted through the corresponding wire holes within the volar locking plate. The distal screw holes in the plate were next filled by placing appropriately drilled, sized, and inserted unicortical locking screws. With the distal portion of the plate filled, attention was once again turned proximally where two additional bicortical screws were placed in the corresponding plate holes in standard fashion as described above. Following hardware placement intraoperative C-arm fluoroscopy was utilized in multiple planes to confirm maintenance of fracture reduction and adequate hardware placement.  The intraoperative C-arm images were reviewed. The DRUJ was noted to be stable in supination, neutral, and pronation. A Martinez shift test was performed and noted to be negative for palpable subluxation of the carpus or other evidence of instability. No crepitance or stepoffs noted with loaded radiocarpal shear testing.     The tourniquet was released and " meticulous hemostasis achieved with bipolar cautery.  The wound was closed in layers with interrupted buried 4-0 Monocryl suture in the dermis, and 4-0 nylon suture in a horizontal mattress fashion for the skin.  Sterile dressings consisting of Xeroform, 4 x 4 gauze, and cast padding were applied.  A well-padded volar resting splint was applied with the MCPs free for uninhibited composite digital flexion.       The patient was transferred to the hospital bed and transported to the post-anesthesia care unit in good condition having tolerated the procedure well. All sponge, needle, and instrument counts were correct x2. Postoperatively, the digits were pink and well perfused and the hand compartments and soft tissues were supple. No complications were apparent.    Postoperative Plan:  The patient will be nonweightbearing on their operative site. Their dressing will be removed in office.   They will return to clinic in 2 weeks. X-rays are needed on return visit.  Complications:  None; patient tolerated the procedure well.    Disposition: PACU - hemodynamically stable.  Condition: stable         Additional Details: Comminuted intra-articular distal radius consisting of a volar shear component.    Attending Attestation: I performed the procedure.    Natalio Ivan  Phone Number: [phone]

## 2024-08-27 ENCOUNTER — HOSPITAL ENCOUNTER (OUTPATIENT)
Dept: RADIOLOGY | Facility: HOSPITAL | Age: 39
Discharge: HOME | End: 2024-08-27
Payer: COMMERCIAL

## 2024-08-27 ENCOUNTER — OFFICE VISIT (OUTPATIENT)
Dept: ORTHOPEDIC SURGERY | Facility: HOSPITAL | Age: 39
End: 2024-08-27
Payer: COMMERCIAL

## 2024-08-27 DIAGNOSIS — S69.91XA WRIST INJURY, RIGHT, INITIAL ENCOUNTER: ICD-10-CM

## 2024-08-27 DIAGNOSIS — S69.91XA WRIST INJURY, RIGHT, INITIAL ENCOUNTER: Primary | ICD-10-CM

## 2024-08-27 PROCEDURE — 73110 X-RAY EXAM OF WRIST: CPT | Mod: RIGHT SIDE | Performed by: RADIOLOGY

## 2024-08-27 PROCEDURE — L3908 WHO COCK-UP NONMOLDE PRE OTS: HCPCS | Performed by: STUDENT IN AN ORGANIZED HEALTH CARE EDUCATION/TRAINING PROGRAM

## 2024-08-27 PROCEDURE — 73110 X-RAY EXAM OF WRIST: CPT | Mod: RT

## 2024-08-27 PROCEDURE — 99211 OFF/OP EST MAY X REQ PHY/QHP: CPT | Performed by: STUDENT IN AN ORGANIZED HEALTH CARE EDUCATION/TRAINING PROGRAM

## 2024-08-27 NOTE — PROGRESS NOTES
[facility]  Hand and Upper Extremity Service  Post Operative visit        Date of surgery: 8/27/2024  Surgery(s) performed: ORIF right distal radius fracture        Subjective report:   Patient is doing well postoperatively.  She denies pain.  She denies numbness and tingling.  She is maintaining her splint.  She denies fever or chills.  Overall she is doing well.        Exam findings; right upper extremity  Well-padded splint was removed in office.  Incision is well-healed.  There is no erythema, warmth, drainage.  Sutures are intact.  Patient able to flex and extend the MP and IP joints of the fingers.  Sensation is intact to light touch to the median, ulnar, radial nerves.  Sensation is intact to light touch to the palmar cutaneous branch of the median nerve.        Radiograph findings: XR of the right upper extremity taken reviewed in office today  Status post open reduction internal fixation right distal radius in appropriate alignment.  Near anatomic alignment.  Fracture is maintained.  No signs of hardware failure or loosening.        Plan:   Sutures removed in office today.  Patient will transition to a Velcro removable brace.  She is to wear this at all times For hygiene purposes and for gentle range of motion.  We will send her to occupational therapy to start working on gentle range of motion.  She was instructed to continue to work on supination as she has limited supination.  She will follow-up in 4 weeks for repeat evaluation.  X-rays out of brace on follow-up.            Medications Prescribed: None           Will Beucler, DO  [facility] [employer]  Department of Orthopaedic Surgery  Hand and Upper Extremity Surgery  [facility]     Dictation performed with the use of voice recognition software. Syntax and grammatical errors may exist.

## 2024-08-30 ENCOUNTER — OFFICE VISIT (OUTPATIENT)
Dept: OBSTETRICS AND GYNECOLOGY | Facility: CLINIC | Age: 39
End: 2024-08-30
Payer: COMMERCIAL

## 2024-08-30 VITALS — WEIGHT: 175 LBS | BODY MASS INDEX: 32.01 KG/M2 | SYSTOLIC BLOOD PRESSURE: 133 MMHG | DIASTOLIC BLOOD PRESSURE: 87 MMHG

## 2024-08-30 DIAGNOSIS — N39.3 SUI (STRESS URINARY INCONTINENCE, FEMALE): Primary | ICD-10-CM

## 2024-08-30 DIAGNOSIS — N94.19 DYSPAREUNIA DUE TO MEDICAL CONDITION IN FEMALE PATIENT: ICD-10-CM

## 2024-08-30 LAB
POC APPEARANCE, URINE: CLEAR
POC BILIRUBIN, URINE: NEGATIVE
POC BLOOD, URINE: NEGATIVE
POC COLOR, URINE: YELLOW
POC GLUCOSE, URINE: NEGATIVE MG/DL
POC KETONES, URINE: NEGATIVE MG/DL
POC LEUKOCYTES, URINE: NEGATIVE
POC NITRITE,URINE: NEGATIVE
POC PH, URINE: 5.5 PH
POC PROTEIN, URINE: NEGATIVE MG/DL
POC SPECIFIC GRAVITY, URINE: 1.02
POC UROBILINOGEN, URINE: 0.2 EU/DL

## 2024-08-30 PROCEDURE — 81003 URINALYSIS AUTO W/O SCOPE: CPT | Mod: QW | Performed by: OBSTETRICS & GYNECOLOGY

## 2024-08-30 PROCEDURE — 99214 OFFICE O/P EST MOD 30 MIN: CPT | Performed by: OBSTETRICS & GYNECOLOGY

## 2024-08-30 RX ORDER — ESCITALOPRAM OXALATE 10 MG/1
10 TABLET ORAL DAILY
COMMUNITY

## 2024-08-30 ASSESSMENT — PAIN SCALES - GENERAL: PAINLEVEL: 0-NO PAIN

## 2024-08-30 NOTE — PROGRESS NOTES
[facility] Department of Urogynecology   Trina Montano MD, MPH   [phone]    ASSESSMENT AND PLAN:   38 y.o. female being assessed for DMITRI, rare UUI, and dyspareunia. Co morbidities: depression with anxiety.     Diagnoses:      #1 DMITRI with intense workouts   #2 Urinary frequency, rare UUI  #3 Dyspareunia      Plan:   1. DMITRI with intense workouts   - We discussed options for stress urinary incontinence including pelvic floor physical therapy, pessary use during workouts, urethral bulking and MUS placement. There is an 85% success rate with the MUS. The mesh is permanent however with potential weight fluctuations and age, patients can expect 15 years of sx relief. She would be given 2 weeks off work with no heavy lifting for 6 weeks after the sling. Data shows at 8 years after Bulkamid the success rate is around a 92% improvement rate. In practice, the success rate is 60% in terms of total dryness with Bulkamid. There is a 20-40% risk of needing a repeat Bulkamid procedure. She was given information to read on the anti-incontinence procedures.   - PFPT requisition sent today. Given list of physical therapists with their corresponding locations/contact information.  - She is interested in being fit with a pessary that she can use during workouts.     2. Urinary frequency, rare UUI  - It is normal to drink 60-70 oz of fluid daily. Discussed bladder retraining can help with her urinary frequency - slowly increase the maximum time in between voids by 15 minutes about every 2 days. The goal is to void every 2-3 hours.   - Patient plans to attend PFPT.     3. Dyspareunia   - Referred to PFPT.      Follow-up with Sherita WRIGHT-CNP for a pessary fitting.     Scribe Attestation:   Lucia BEAN, am scribing for virtually, and in the presence of Trina Montano MD MPH on 8/30/24 at 12:38 PM.       Problem List Items Addressed This Visit    None  Visit Diagnoses       DMITRI (stress urinary incontinence,  female)    -  Primary    Relevant Orders    Referral to Physical Therapy    POCT UA Automated manually resulted (Completed)    Measure post void residual    Dyspareunia due to medical condition in female patient        Relevant Orders    Referral to Physical Therapy           I spent a total of 45 minutes in face to face and non face to face time.     I Dr. Montano, personally performed the services described in the documentation as scribed in my presence and confirm it is both complete and accurate.  Trina Montano MD, MPH, FACOG       Trina Montano MD, MPH, FACOG     New    HISTORY OF PRESENT ILLNESS:     Tung Davis is a 38 y.o. female who presents for DMITRI. Referred by Dr. Jeffery.     Prolapse Symptoms:  - Denies feeling of vaginal bulge.      Urinary Symptoms:    - She's had DMITRI (leaks with sneezing, laughing, dead lifting 210 lbs, jumping jacks) since her second child was born 5 years ago. She reports incontinence has worsened since then.   - Feels like she has to void constantly.   - Underwear is constantly damp when she is going to the gym.   - In one hour of cross fit, she voids 4-5x.   - Tried PFPT after her 2nd child. She was poorly compliant, so she didn't feel like it helped.   - Endorses a few drops of urine leakage with urgency if she is on the road or waits too long to void. DMITRI is more common.   - Voids 10-20x daily.   - Drinks 24 oz of coffee and about 30 oz of water per day.   - She gets up once at night to void.   - Pt often strains to feel she's emptied her bladder and then has a small volume void soon afterwards.   - Denies leaking urine with intercourse.   - She recently started having menses again and is unable to say if the tampons help with DMITRI (She had an IUD removed on 7/16/24 in hopes it would help with weight loss and is considering it placed back in October)  - DMITRI occurs 2-3x daily mainly when she works out. She typically changes clothes afterwards.     Bowel Symptoms:   - BMs twice  daily, soft and easy to pass.   - Denies FI.     OBGYN History and Sexual Activity:   - ; c section x1 and then vaginal delivery x2    - She had a 2nd degree laceration with both vaginal deliveries.   - Largest baby: around 8 lbs    - She is sexually active and has a hx of chronic dyspareunia with deep, belly pain.        Past Medical History:     - recently broke her distal radius 2 weeks ago and she is no longer doing her half marathon   - depression with anxiety  Past Medical History:   Diagnosis Date    Anxiety     Depression     Fractures     Ganglion, right wrist 2022    Ganglion of right wrist    Other specified anxiety disorders 2022    Anxiety with depression          Past Surgical History:     - c section   Past Surgical History:   Procedure Laterality Date     SECTION, LOW TRANSVERSE  2014    OTHER SURGICAL HISTORY Right 2018    Wrist surgery         Medications:     Prior to Admission medications    Medication Sig Start Date End Date Taking? Authorizing Provider   escitalopram (Lexapro) 10 mg tablet Take 1 tablet (10 mg) by mouth once daily.    Historical Provider, MD   gabapentin (Neurontin) 300 mg capsule Take 1 capsule (300 mg) by mouth if needed.    Historical Provider, MD   ondansetron (Zofran) 4 mg tablet Take 2 tablets (8 mg) by mouth every 8 hours if needed for nausea or vomiting.  Patient not taking: Reported on 2024 8/15/24   Vaishali Blackmon MD   oxyCODONE-acetaminophen (Percocet) 5-325 mg tablet Take 1 tablet by mouth every 6 hours if needed for severe pain (7 - 10).  Patient not taking: Reported on 2024 8/15/24   Vaishali Blackmon MD        ROS  Review of Systems       PHYSICAL EXAM:    /87 (BP Location: Left arm, Patient Position: Sitting, BP Cuff Size: Adult)   Wt 79.4 kg (175 lb)   LMP 2024 Comment: negative hcg 8/15/24  BMI 32.01 kg/m²   Patient's last menstrual period was 2024.    Declines chaperone for physical  exam.      Well developed, well nourished, in no apparent distress.   Neurologic/Psychiatric:  Awake, Alert and Oriented times 3.  Affect normal.     GENITAL/URINARY:     External Genitalia:  The patient has normal appearing external genitalia, normal skenes and bartholins glands, and a normal hair distribution.  Her vulva is without lesions, erythema or discharge.  It is non-tender with appropriate sensation.     Urethral Meatus:  Size normal, Location normal, Lesions absent, Prolapse absent.    Urethra:  Fullness absent, Masses absent.    Bladder:  Fullness absent, Masses absent, Tenderness absent.    Vagina:  General appearance normal, Estrogen effect normal, Discharge absent, Lesions absent.     Cervix: Normal, no discharge.   Uterus:  normal size and mobile  Adnexa:   normal    Anus/Perineum:  Lesions absent and Masses absent defer exam  Normal Perineum    Stress urinary incontinence not demonstrable.       Physical Exam  Genitourinary:   POP-Q measurements were:      Aa: -1, Ba: -1, C: -9     gH: 5, pB: 5, TVL: 10     Ap: 0, Bp: 0, D: -10            Data and DIAGNOSTIC STUDIES REVIEWED   FL fluoro images no charge    Result Date: 8/15/2024  These images are not reportable by radiology and will not be interpreted by  Radiologists.    XR wrist right 3+ views    Result Date: 8/9/2024  Interpreted By:  Faustina Galaviz, STUDY: Right wrist,  3 views.   INDICATION: Signs/Symptoms:right wrist fracture.   COMPARISON: None.   ACCESSION NUMBER(S): [id number]   ORDERING CLINICIAN: NICOLE SARABIA   FINDINGS: Acute appearing comminuted fracture of the distal radius noted involving the region of the distal metaphysis and radial styloid with extension to the articular surface.   No significant degenerative changes.   Soft tissues are within normal limits.       1. Acute comminuted nondisplaced intra-articular distal radial fracture involving the region of the radial styloid.   MACRO: None.   Signed by: Faustina Galaviz  "8/9/2024 6:46 AM Dictation workstation:   [id number]     No results found for: \"URINECULTURE\", \"UAMICCOMM\"   Lab Results   Component Value Date    GLUCOSE 71 (L) 03/10/2023    CALCIUM 8.7 03/10/2023     03/10/2023    K 4.2 03/10/2023    CO2 28 03/10/2023     03/10/2023    BUN 17 03/10/2023    CREATININE 0.82 03/10/2023     Lab Results   Component Value Date    WBC 6.6 09/26/2023    HGB 13.0 09/26/2023    HCT 41.6 09/26/2023    MCV 90 09/26/2023     09/26/2023        "

## 2024-08-31 NOTE — PATIENT INSTRUCTIONS
We discussed lifestyle changes includin) Aiming to drink around 60 oz total of fluids per day   2) Avoiding bladder irritants such as caffeine, nicotine, artificial sweeteners   3) Stop drinking fluids 3 hours before bedtime   4) Timed voids every 2-3 hours.

## 2024-09-04 ENCOUNTER — EVALUATION (OUTPATIENT)
Dept: OCCUPATIONAL THERAPY | Facility: HOSPITAL | Age: 39
End: 2024-09-04
Payer: COMMERCIAL

## 2024-09-04 DIAGNOSIS — S69.91XA WRIST INJURY, RIGHT, INITIAL ENCOUNTER: ICD-10-CM

## 2024-09-04 PROCEDURE — 97165 OT EVAL LOW COMPLEX 30 MIN: CPT | Mod: GO | Performed by: OCCUPATIONAL THERAPIST

## 2024-09-04 PROCEDURE — 97110 THERAPEUTIC EXERCISES: CPT | Mod: GO | Performed by: OCCUPATIONAL THERAPIST

## 2024-09-04 ASSESSMENT — ENCOUNTER SYMPTOMS
LOSS OF SENSATION IN FEET: 0
OCCASIONAL FEELINGS OF UNSTEADINESS: 0
DEPRESSION: 0

## 2024-09-04 NOTE — PROGRESS NOTES
Occupational Therapy  Occupational Therapy Orthopedic Evaluation    Patient Name: Tung Davis  MRN: [id number]  Today's Date: 9/4/2024       Insurance:  Visit number: 1   General:  Reason for visit: R wrist  Referred by: Dr. Ivan  Time:  Time Calculation  Start Time: 0730  Stop Time: 0810  Time Calculation (min): 40 min  OT Evaluation Time Entry  OT Evaluation (Low) Time Entry: 20  OT Therapeutic Procedures Time Entry  Therapeutic Exercise Time Entry: 10    Insurance Type: Payor: MEDICAL [facility] / Plan: [employer] / Product Type: *No Product type* /     Current Problem  1. Wrist injury, right, initial encounter  Referral to Occupational Therapy        Precautions: per post op protocol     Medical History Form: Reviewed (scanned into chart)    Subjective:   Chief Complaint: R wrist  MIREYA: Fall   DOI: 08/06/2024  DOS: 08/15/2024; ORIF right distal radius fracture     Hand Dominance: Right    Current Condition since injury:   same     PAIN     Location: dorsal wrist  Intensity: 0/10 up to 2/10  Description: aching  Aggravating Factors: functional use  Relieving Factors:  Rest     Relevant Information (PMH & Previous Tests/Imaging): reviewed in chart    Prior Level of Function (PLOF)  Exercise/Physical Activity: crossfit, swimming, running  Work/School: physician  Current ADL/IADL Status: independent with homemaking     Patients Living Environment: Reviewed and no concern    Primary Language: English    Pt goals for therapy: improve functional use for cooking, cleaning, dressing, crossfit, care for kids, opening jars, cooking, work tasks, and various household chores    Red Flags: Do you have any of the following? No  Fever/chills, unexplained weight changes, dizziness/fainting, unexplained change in bowel or bladder functions, unexplained malaise or muscle weakness, night pain/sweats, numbness or tingling    Objective:    Distance to DPC  2nd: 1 cm  3rd: 1 cm  4th: 1 cm  5th: 1 cm  Thumb  opposition 2 cm from base  Wrist extension/flexion 30/15  Wrist ulnar/radial deviation 15/15  Forearm pronation/supination 80/35    Physical Observation: steri strips intact, patient presented in prefab wrist splint, mild edema along dorsal wrist    Numbness/Tingling: denies numbness/tingling      Outcome Measures:  Quick DASH: 47.72    EDUCATION: home exercise program, plan of care, activity modifications, pain management, and injury pathology     Good rehab potential    Goals:  Active       OT Goals       OT Goal 1       Start:  09/04/24    Expected End:  10/09/24       Achieve composite fist for grasping objects in hand.         OT Goal 2       Start:  09/04/24    Expected End:  10/09/24       Improve forearm supination to 70 degrees for dressing and driving.         OT Goal 3       Start:  09/04/24    Expected End:  11/06/24       Improve Quick DASH to 15%.         OT Goal 4       Start:  09/04/24    Expected End:  11/06/24       Report max pain of 1/10 for crossfit.         OT Goal 5       Start:  09/04/24    Expected End:  11/06/24       Improve  strength to 80% of unaffected side for lifting heavier household objects.             Plan of care was developed with input and agreement by the patient    Treatments:     Modalities:      10 min  Heating pad applied to circumferential hand prior to completion of home program.      Therapeutic Exercise:   10 min  HEP education and completion: hook fist, straight fist, composite fist,  AARAOM wrist extension and flexion, AAROM wrist ulnar and radial deviation, AAROM forearm pronation and supination, tenodesis wrist, radial and ulnar deviation, forearm pronation and supination     Low complexity evaluation    20 min         Assessment: Patient is a 37 yo female  s/p ORIF right distal radius fracture resulting in limited participation in pain-free ADLs and inability to perform at their prior level of function. Pt would benefit from occupational therapy to address the  impairments found & listed previously in the objective section in order to return to safe and pain-free ADLs and prior level of function.     Plan:      Planned Interventions include: therapeutic exercise, therapeutic activity, self-care home management, manual therapy, therapeutic activities, gait training, neuromuscular coordination, vasopneumatic, dry needling, aquatic therapy, electric stimulation, fluidotherapy, ultrasound, kinesiotaping, orthosis fabrication, wound care  Frequency: 1 x Week  Duration: 10 weeks      Samson Aguilar, OT

## 2024-09-10 ENCOUNTER — OFFICE VISIT (OUTPATIENT)
Dept: OBSTETRICS AND GYNECOLOGY | Facility: CLINIC | Age: 39
End: 2024-09-10
Payer: COMMERCIAL

## 2024-09-10 VITALS
WEIGHT: 179 LBS | HEIGHT: 62 IN | BODY MASS INDEX: 32.94 KG/M2 | HEART RATE: 69 BPM | DIASTOLIC BLOOD PRESSURE: 81 MMHG | SYSTOLIC BLOOD PRESSURE: 119 MMHG

## 2024-09-10 DIAGNOSIS — N39.3 SUI (STRESS URINARY INCONTINENCE, FEMALE): Primary | ICD-10-CM

## 2024-09-10 DIAGNOSIS — Z46.89 ENCOUNTER FOR FITTING AND ADJUSTMENT OF PESSARY: ICD-10-CM

## 2024-09-10 PROCEDURE — 57160 INSERT PESSARY/OTHER DEVICE: CPT | Performed by: NURSE PRACTITIONER

## 2024-09-10 PROCEDURE — A4562 PESSARY, NON RUBBER,ANY TYPE: HCPCS | Performed by: NURSE PRACTITIONER

## 2024-09-10 PROCEDURE — 3008F BODY MASS INDEX DOCD: CPT | Performed by: NURSE PRACTITIONER

## 2024-09-10 PROCEDURE — 99213 OFFICE O/P EST LOW 20 MIN: CPT | Performed by: NURSE PRACTITIONER

## 2024-09-10 PROCEDURE — 99213 OFFICE O/P EST LOW 20 MIN: CPT | Mod: 25 | Performed by: NURSE PRACTITIONER

## 2024-09-10 ASSESSMENT — ENCOUNTER SYMPTOMS
EYES NEGATIVE: 1
ENDOCRINE NEGATIVE: 1
MUSCULOSKELETAL NEGATIVE: 1
ALLERGIC/IMMUNOLOGIC NEGATIVE: 1
HEMATOLOGIC/LYMPHATIC NEGATIVE: 1
CARDIOVASCULAR NEGATIVE: 1
CONSTITUTIONAL NEGATIVE: 1
PSYCHIATRIC NEGATIVE: 1
RESPIRATORY NEGATIVE: 1
NEUROLOGICAL NEGATIVE: 1
GASTROINTESTINAL NEGATIVE: 1

## 2024-09-10 ASSESSMENT — PAIN SCALES - GENERAL: PAINLEVEL: 0-NO PAIN

## 2024-09-10 NOTE — PROGRESS NOTES
"Urogynecology Pessary Check Visit  Sherita Norman CNP   [phone]      History of Present Illness:  HPI: Ms. Tung Davis  presents for pessary fitting.     Last visit: 8/30/2024 with Dr. Montano for stress urinary incontinence with workouts, urinary frequency, and dyspareunia.  The plan was for her to go to pelvic floor physical therapy and inquire about being fitted for a pessary.  Pessary type: #2 flexible incontinence ring   Bleeding?     Bowel or bladder symptoms: She has stress urinary incontinence with workouts.  She is not going to pelvic floor physical therapy.  Vaginal estrogen: She does not currently use estrogen cream.     Past medical, surgical, social, family, allergy, and medication histories were reviewed and updated in EPIC charting.       Review of Systems   Constitutional: Negative.    HENT: Negative.     Eyes: Negative.    Respiratory: Negative.     Cardiovascular: Negative.    Gastrointestinal: Negative.    Endocrine: Negative.    Genitourinary: Negative.    Musculoskeletal: Negative.    Skin: Negative.    Allergic/Immunologic: Negative.    Neurological: Negative.    Hematological: Negative.    Psychiatric/Behavioral: Negative.          Objective    /81   Pulse 69   Ht 1.575 m (5' 2\")   Wt 81.2 kg (179 lb)   LMP 08/28/2024 Comment: negative hcg 8/15/24  BMI 32.74 kg/m²    Body mass index is 32.74 kg/m².     Physical Exam:  Physical Exam  Genitourinary:      Vulva normal.   HENT:      Head: Normocephalic.   Pulmonary:      Effort: Pulmonary effort is normal.   Neurological:      Mental Status: She is alert.   Psychiatric:         Mood and Affect: Mood normal.         Thought Content: Thought content normal.         Judgment: Judgment normal.             Pelvic exam:   Speculum examination: The vagina and cervix were inspected and were free  of erosions. No blood in vaginal vault. Normal discharge appreciated.    Bimanual exam: The uterus was normal.  There were no masses or " tenderness in the pelvis/adnexal region.   The pessary was fitted and placed.       Assessment and Plan:    38 year old female with stress urinary incontinence being fitted for a pessary. Comorbidities include: depression with anxiety and obesity.    Diagnoses:  #1 Pessary Fitting    Plan:  1. Pessary Fitting  - Fitted with a #2 flexible incontinence ring  -  We provided the patient today with at home pessary maintenance education on how to remove the pessary with an inert lubricant by folding the pessary in half and placing it above the pubic bone, how often to remove the pessary and clean it/replace it back, etc. We strongly encouraged her to remove the pessary at least once every 2 weeks to reduce vaginal discharge and odor with the pessary or to return to clinic once every 3 months for us to do pessary maintenance to reduce the risk of erosions from the pessary. The patient was successfully able to remove and replace the pessary back herself today.   - No estrogen cream      Return to clinic in 3-4 months    ADRIANA Camacho-CNP

## 2024-09-11 ENCOUNTER — TREATMENT (OUTPATIENT)
Dept: OCCUPATIONAL THERAPY | Facility: HOSPITAL | Age: 39
End: 2024-09-11
Payer: COMMERCIAL

## 2024-09-11 DIAGNOSIS — S69.91XA WRIST INJURY, RIGHT, INITIAL ENCOUNTER: ICD-10-CM

## 2024-09-11 PROCEDURE — 97110 THERAPEUTIC EXERCISES: CPT | Mod: GO | Performed by: OCCUPATIONAL THERAPIST

## 2024-09-11 PROCEDURE — 97140 MANUAL THERAPY 1/> REGIONS: CPT | Mod: GO | Performed by: OCCUPATIONAL THERAPIST

## 2024-09-11 NOTE — PROGRESS NOTES
Occupational Therapy  Occupational Therapy Orthopedic Evaluation    Patient Name: Tung Davis  MRN: [id number]  Today's Date: 9/11/2024       Insurance:  Visit number: 1   General:  Reason for visit: R wrist  Referred by: Dr. Iavn  Time:     Time:  Time Calculation  Start Time: 0900  Stop Time: 0940  Time Calculation (min): 40 min  OT Therapeutic Procedures Time Entry  Manual Therapy Time Entry: 10  Therapeutic Exercise Time Entry: 30    Insurance Type: Payor: MEDICAL [facility] / Plan: [employer]ICAL [employer] / Product Type: *No Product type* /     Current Problem  1. Wrist injury, right, initial encounter  Follow Up In Occupational Therapy        Precautions: per post op protocol     Medical History Form: Reviewed (scanned into chart)    Subjective:  I think I am bending it better.  Chief Complaint: R wrist  MIREYA: Fall   DOI: 08/06/2024  DOS: 08/15/2024; ORIF right distal radius fracture     Hand Dominance: Right    Current Condition since injury:   same     PAIN     Location: dorsal wrist  Intensity: 0/10 up to 2/10  Description: aching  Aggravating Factors: functional use  Relieving Factors:  Rest     Relevant Information (PMH & Previous Tests/Imaging): reviewed in chart    Prior Level of Function (PLOF)  Exercise/Physical Activity: crossfit, swimming, running  Work/School: physician  Current ADL/IADL Status: independent with homemaking     Patients Living Environment: Reviewed and no concern    Primary Language: English    Pt goals for therapy: improve functional use for cooking, cleaning, dressing, crossfit, care for kids, opening jars, cooking, work tasks, and various household chores    Red Flags: Do you have any of the following? No  Fever/chills, unexplained weight changes, dizziness/fainting, unexplained change in bowel or bladder functions, unexplained malaise or muscle weakness, night pain/sweats, numbness or tingling    Objective:    Distance to DPC  2nd: 0 cm was 1 cm  3rd: 0 cm was 1 cm  4th: 0  cm was 1 cm  5th: 0 cm was 1 cm  Thumb opposition to base of 5th 2 cm from base  Wrist extension/flexion 35 was 30/18 was 15  Wrist ulnar/radial deviation 25 was 15/15  Forearm pronation/supination 80/65 was 35    Physical Observation: steri strips intact, patient presented in prefab wrist splint, mild edema along dorsal wrist    Numbness/Tingling: denies numbness/tingling      Outcome Measures:  Quick DASH: 47.72    EDUCATION: home exercise program, plan of care, activity modifications, pain management, and injury pathology     Good rehab potential    Goals:    Plan of care was developed with input and agreement by the patient    Treatments:       Therapeutic Exercise:   30 min  HEP education and completion: hook fist, straight fist, composite fist,  AARAOM wrist extension and flexion, AAROM wrist ulnar and radial deviation, AAROM forearm pronation and supination, tenodesis wrist, radial and ulnar deviation, forearm pronation and supination  Digit and wrist range of motion in dry warm medium to increase tissue extensibility and improve range of motion.  Wrist maze for improved dynamic wrist range of motion.  Active DTM.      Manual Therapy    10 minutes  Therapist performed manual wrist stretches: wrist flexion, extension, ulnar deviation, and radial deviation.         Assessment: ROM improved today. Patient reports good compliance with home program. Patient educated on wrist extension holds. Patient to follow up in two weeks per patient request.      Plan:      Planned Interventions include: therapeutic exercise, therapeutic activity, self-care home management, manual therapy, therapeutic activities, gait training, neuromuscular coordination, vasopneumatic, dry needling, aquatic therapy, electric stimulation, fluidotherapy, ultrasound, kinesiotaping, orthosis fabrication, wound care  Frequency: 1 x Week  Duration: 10 weeks      Samson Aguilar, OT

## 2024-09-23 ENCOUNTER — APPOINTMENT (OUTPATIENT)
Dept: PRIMARY CARE | Facility: CLINIC | Age: 39
End: 2024-09-23
Payer: COMMERCIAL

## 2024-09-23 ENCOUNTER — TREATMENT (OUTPATIENT)
Dept: OCCUPATIONAL THERAPY | Facility: HOSPITAL | Age: 39
End: 2024-09-23
Payer: COMMERCIAL

## 2024-09-23 VITALS
HEIGHT: 63 IN | WEIGHT: 181 LBS | OXYGEN SATURATION: 99 % | HEART RATE: 69 BPM | DIASTOLIC BLOOD PRESSURE: 78 MMHG | BODY MASS INDEX: 32.07 KG/M2 | SYSTOLIC BLOOD PRESSURE: 119 MMHG | RESPIRATION RATE: 16 BRPM

## 2024-09-23 DIAGNOSIS — Z00.00 ANNUAL PHYSICAL EXAM: ICD-10-CM

## 2024-09-23 DIAGNOSIS — S52.501A CLOSED FRACTURE OF RIGHT DISTAL RADIUS AND ULNA, INITIAL ENCOUNTER: Primary | ICD-10-CM

## 2024-09-23 DIAGNOSIS — S52.601A CLOSED FRACTURE OF RIGHT DISTAL RADIUS AND ULNA, INITIAL ENCOUNTER: Primary | ICD-10-CM

## 2024-09-23 DIAGNOSIS — Z12.11 COLON CANCER SCREENING: ICD-10-CM

## 2024-09-23 DIAGNOSIS — R40.0 DAYTIME SLEEPINESS: Primary | ICD-10-CM

## 2024-09-23 DIAGNOSIS — S69.91XA WRIST INJURY, RIGHT, INITIAL ENCOUNTER: ICD-10-CM

## 2024-09-23 PROCEDURE — 1036F TOBACCO NON-USER: CPT | Performed by: INTERNAL MEDICINE

## 2024-09-23 PROCEDURE — 97140 MANUAL THERAPY 1/> REGIONS: CPT | Mod: GO | Performed by: OCCUPATIONAL THERAPIST

## 2024-09-23 PROCEDURE — 3008F BODY MASS INDEX DOCD: CPT | Performed by: INTERNAL MEDICINE

## 2024-09-23 PROCEDURE — 97110 THERAPEUTIC EXERCISES: CPT | Mod: GO | Performed by: OCCUPATIONAL THERAPIST

## 2024-09-23 PROCEDURE — 99395 PREV VISIT EST AGE 18-39: CPT | Performed by: INTERNAL MEDICINE

## 2024-09-23 ASSESSMENT — ENCOUNTER SYMPTOMS
LOSS OF SENSATION IN FEET: 0
OCCASIONAL FEELINGS OF UNSTEADINESS: 0
DEPRESSION: 0

## 2024-09-23 ASSESSMENT — PATIENT HEALTH QUESTIONNAIRE - PHQ9
SUM OF ALL RESPONSES TO PHQ9 QUESTIONS 1 AND 2: 0
1. LITTLE INTEREST OR PLEASURE IN DOING THINGS: NOT AT ALL
2. FEELING DOWN, DEPRESSED OR HOPELESS: NOT AT ALL

## 2024-09-23 ASSESSMENT — PAIN SCALES - GENERAL: PAINLEVEL: 0-NO PAIN

## 2024-09-23 NOTE — PROGRESS NOTES
"Subjective   Patient ID: Tung Davis is a 38 y.o. female who presents for Annual Exam and Excessive Daytime Sleepiness.    Annual physical exam.  She has history of depression takes escitalopram 10 mg daily, stable.  She has had an accidental fall last month has had right wrist fracture, surgically repaired.  Wears a brace.  Complains of fatigue, daytime sleepiness.  BMI 32.         Review of Systems   Constitutional:         Daytime fatigue, sleepiness.   All other systems reviewed and are negative.      Objective   /78 (BP Location: Left arm, Patient Position: Sitting)   Pulse 69   Resp 16   Ht 1.6 m (5' 2.99\")   Wt 82.1 kg (181 lb)   LMP 08/28/2024 Comment: negative hcg 8/15/24  SpO2 99%   BMI 32.07 kg/m²     Physical Exam  Constitutional:       Appearance: Normal appearance.   HENT:      Head: Normocephalic and atraumatic.      Right Ear: External ear normal.      Left Ear: External ear normal.      Mouth/Throat:      Mouth: Mucous membranes are moist.      Pharynx: Oropharynx is clear.   Neck:      Vascular: No carotid bruit.   Cardiovascular:      Rate and Rhythm: Normal rate and regular rhythm.      Heart sounds: No murmur heard.     No gallop.   Pulmonary:      Effort: Pulmonary effort is normal. No respiratory distress.      Breath sounds: No wheezing or rales.   Abdominal:      General: Abdomen is flat.      Palpations: Abdomen is soft.      Hernia: No hernia is present.   Musculoskeletal:         General: No swelling, tenderness, deformity or signs of injury.      Cervical back: No rigidity or tenderness.      Right lower leg: No edema.      Comments: Rt. Wrist brace.   Lymphadenopathy:      Cervical: No cervical adenopathy.   Skin:     Coloration: Skin is not jaundiced or pale.      Findings: No bruising, erythema, lesion or rash.   Neurological:      General: No focal deficit present.      Mental Status: She is oriented to person, place, and time.      Motor: No weakness.      " Coordination: Coordination normal.   Psychiatric:         Mood and Affect: Mood normal.         Behavior: Behavior normal.         Assessment/Plan   Problem List Items Addressed This Visit             ICD-10-CM    Annual physical exam - Primary Z00.00     Advised to have influenza vaccine this fall.  New COVID-vaccine.  Screening colonoscopy after turning 39, positive family history of colon cancer.  Last screening PAP test 2022.  Have fasting blood work.  Follow healthy diet, continue regular exercises.         Daytime sleepiness R40.0     Have Sleep study to rule out CRISTIANA.         Relevant Orders    Home sleep apnea test (HSAT)     Other Visit Diagnoses         Codes    Colon cancer screening     Z12.11    Relevant Orders    Colonoscopy Screening; High Risk Patient

## 2024-09-23 NOTE — ASSESSMENT & PLAN NOTE
Advised to have influenza vaccine this fall.  New COVID-vaccine.  Screening colonoscopy after turning 39, positive family history of colon cancer.  Last screening PAP test 2022.  Have fasting blood work.  Follow healthy diet, continue regular exercises.

## 2024-09-23 NOTE — PROGRESS NOTES
Occupational Therapy  Occupational Therapy Orthopedic Treatment    Patient Name: Tung Davis  MRN: [id number]  Today's Date: 9/23/2024       Insurance:  Visit number:  3  General:  Reason for visit: R wrist  Referred by: Dr. Ivan  Time:  Time Calculation  Start Time: 0900  Stop Time: 0945  Time Calculation (min): 45 min  OT Therapeutic Procedures Time Entry  Manual Therapy Time Entry: 10  Therapeutic Exercise Time Entry: 30      Insurance Type: Payor: [employer] [place] / Plan: [employer] / Product Type: *No Product type* /     Current Problem  1. Closed fracture of right distal radius and ulna, initial encounter            Precautions: per post op protocol     Medical History Form: Reviewed (scanned into chart)    Subjective:  I think I am bending it better.  Chief Complaint: R wrist  MIREYA: Fall   DOI: 08/06/2024  DOS: 08/15/2024; ORIF right distal radius fracture     Hand Dominance: Right    Current Condition since injury:   same     PAIN     Location: dorsal wrist  Intensity: 0/10 up to 2/10  Description: aching  Aggravating Factors: functional use  Relieving Factors:  Rest     Relevant Information (PMH & Previous Tests/Imaging): reviewed in chart    Prior Level of Function (PLOF)  Exercise/Physical Activity: crossfit, swimming, running  Work/School: physician  Current ADL/IADL Status: independent with homemaking     Patients Living Environment: Reviewed and no concern    Primary Language: English    Pt goals for therapy: improve functional use for cooking, cleaning, dressing, crossfit, care for kids, opening jars, cooking, work tasks, and various household chores    Red Flags: Do you have any of the following? No  Fever/chills, unexplained weight changes, dizziness/fainting, unexplained change in bowel or bladder functions, unexplained malaise or muscle weakness, night pain/sweats, numbness or tingling    Objective:    Distance to DPC  2nd: 0 cm   3rd: 0 cm    4th: 0 cm   5th: 0 cm    Thumb  opposition to base of 5th   Wrist extension/flexion 38 was 35  /25 was 18    Wrist ulnar/radial deviation  25 /15  Forearm pronation/supination 80/70 was 65      Physical Observation: steri strips intact, patient presented in prefab wrist splint, mild edema along dorsal wrist    Numbness/Tingling: denies numbness/tingling      Outcome Measures:  Quick DASH: 47.72    EDUCATION: home exercise program, plan of care, activity modifications, pain management, and injury pathology     Good rehab potential    Goals:  Active       OT Goals       OT Goal 1       Start:  09/04/24    Expected End:  10/09/24       Achieve composite fist for grasping objects in hand.         OT Goal 2       Start:  09/04/24    Expected End:  10/09/24       Improve forearm supination to 70 degrees for dressing and driving.         OT Goal 3       Start:  09/04/24    Expected End:  11/06/24       Improve Quick DASH to 15%.         OT Goal 4       Start:  09/04/24    Expected End:  11/06/24       Report max pain of 1/10 for crossfit.         OT Goal 5       Start:  09/04/24    Expected End:  11/06/24       Improve  strength to 80% of unaffected side for lifting heavier household objects.           Plan of care was developed with input and agreement by the patient    Treatments:     Heating pad applied to circumferential wrist. (5 min)    Therapeutic Exercise:   30 min  HEP education and completion: hook fist, straight fist, composite fist,  AARAOM wrist extension and flexion, AAROM wrist ulnar and radial deviation, AAROM forearm pronation and supination, tenodesis wrist, radial and ulnar deviation, forearm pronation and supination  Wrist maze for improved dynamic wrist range of motion.  Active DTM.  Added wrist flexion stretch, wrist extension stretch, and prayer stretch.      Manual Therapy    10 minutes  Therapist performed manual wrist stretches: wrist flexion, extension, ulnar deviation, and radial deviation.         Assessment:    ROM  improved today. Progressed home program. Patient to follow up in two weeks. Patient educated on low load prolonged duration. Patient reports good compliance with home program.   Plan:      Planned Interventions include: therapeutic exercise, therapeutic activity, self-care home management, manual therapy, therapeutic activities, gait training, neuromuscular coordination, vasopneumatic, dry needling, aquatic therapy, electric stimulation, fluidotherapy, ultrasound, kinesiotaping, orthosis fabrication, wound care  Frequency: 1 x Week  Duration: 10 weeks      Samson Aguilar, OT

## 2024-09-24 ENCOUNTER — OFFICE VISIT (OUTPATIENT)
Dept: ORTHOPEDIC SURGERY | Facility: HOSPITAL | Age: 39
End: 2024-09-24
Payer: COMMERCIAL

## 2024-09-24 ENCOUNTER — HOSPITAL ENCOUNTER (OUTPATIENT)
Dept: RADIOLOGY | Facility: HOSPITAL | Age: 39
Discharge: HOME | End: 2024-09-24
Payer: COMMERCIAL

## 2024-09-24 DIAGNOSIS — Z80.0 FAMILY HISTORY OF COLON CANCER: Primary | ICD-10-CM

## 2024-09-24 DIAGNOSIS — S69.91XA WRIST INJURY, RIGHT, INITIAL ENCOUNTER: ICD-10-CM

## 2024-09-24 DIAGNOSIS — S69.91XA WRIST INJURY, RIGHT, INITIAL ENCOUNTER: Primary | ICD-10-CM

## 2024-09-24 PROCEDURE — 99211 OFF/OP EST MAY X REQ PHY/QHP: CPT | Performed by: STUDENT IN AN ORGANIZED HEALTH CARE EDUCATION/TRAINING PROGRAM

## 2024-09-24 PROCEDURE — 1036F TOBACCO NON-USER: CPT | Performed by: STUDENT IN AN ORGANIZED HEALTH CARE EDUCATION/TRAINING PROGRAM

## 2024-09-24 PROCEDURE — 73110 X-RAY EXAM OF WRIST: CPT | Mod: RT

## 2024-09-24 PROCEDURE — 73110 X-RAY EXAM OF WRIST: CPT | Mod: RIGHT SIDE | Performed by: RADIOLOGY

## 2024-09-24 ASSESSMENT — PAIN SCALES - GENERAL: PAINLEVEL_OUTOF10: 1

## 2024-09-24 ASSESSMENT — PAIN - FUNCTIONAL ASSESSMENT: PAIN_FUNCTIONAL_ASSESSMENT: 0-10

## 2024-09-24 NOTE — PROGRESS NOTES
[facility]  Hand and Upper Extremity Service  Post Operative visit        Date of surgery: 8/27/2024  Surgery(s) performed: ORIF right distal radius fracture        Subjective report:   Patient is doing well postoperatively.  She denies pain.  She denies numbness and tingling.  She is maintaining her Velcro removable splint.  She notes stiffness to her wrist.  She continues to work.  She denies fever or chills.  Overall she is doing well.        Exam findings; right upper extremity   Incision is well-healed.  There is no erythema, warmth, drainage.  Patient able to flex and extend the MP and IP joints of the fingers.  She can make a composite fist to the distal palmar crease.  She has no pain to her flexor tendons with finger flexion.  She has pain over her radial styloid.  She has a positive Finkelstein's test.  She has pain with resisted APL and EPB testing.  Sensation is intact to light touch to the median, ulnar, radial nerves.  Sensation is intact to light touch to the palmar cutaneous branch of the median nerve.        Radiograph findings: XR of the right upper extremity taken reviewed in office today  Status post open reduction internal fixation right distal radius in appropriate alignment.  Near anatomic alignment.  Fracture is maintained.  No signs of hardware failure or loosening.        Plan:   She may discontinue her Velcro movable brace.  We discussed that she may gradually return to weightbearing as tolerated over the next 2 weeks.  She is in occupational hand therapy and continue to work on range of motion.  She will follow-up in 5 weeks.  Will repeat x-rays at that time.  We discussed the possibility of removal of orthopedic hardware.      Follow-up in 5 weeks with repeat x-rays          Medications Prescribed: None           Will Beucler, DO  [facility] [employer]  Department of Orthopaedic Surgery  Hand and Upper Extremity  Surgery  [facility]     Dictation performed with the use of voice recognition software. Syntax and grammatical errors may exist.

## 2024-09-25 DIAGNOSIS — Z12.11 COLON CANCER SCREENING: ICD-10-CM

## 2024-09-25 RX ORDER — POLYETHYLENE GLYCOL 3350, SODIUM SULFATE ANHYDROUS, SODIUM BICARBONATE, SODIUM CHLORIDE, POTASSIUM CHLORIDE 236; 22.74; 6.74; 5.86; 2.97 G/4L; G/4L; G/4L; G/4L; G/4L
POWDER, FOR SOLUTION ORAL
Qty: 4000 ML | Refills: 0 | Status: SHIPPED | OUTPATIENT
Start: 2024-09-25

## 2024-10-02 ENCOUNTER — APPOINTMENT (OUTPATIENT)
Dept: PEDIATRICS | Facility: CLINIC | Age: 39
End: 2024-10-02
Payer: COMMERCIAL

## 2024-10-02 DIAGNOSIS — F41.8 DEPRESSION WITH ANXIETY: Primary | ICD-10-CM

## 2024-10-02 DIAGNOSIS — Z23 ENCOUNTER FOR IMMUNIZATION: ICD-10-CM

## 2024-10-02 PROCEDURE — 90656 IIV3 VACC NO PRSV 0.5 ML IM: CPT | Performed by: PEDIATRICS

## 2024-10-02 PROCEDURE — 90471 IMMUNIZATION ADMIN: CPT | Performed by: PEDIATRICS

## 2024-10-02 RX ORDER — ESCITALOPRAM OXALATE 10 MG/1
10 TABLET ORAL DAILY
Qty: 90 TABLET | Refills: 1 | Status: SHIPPED | OUTPATIENT
Start: 2024-10-02 | End: 2025-03-31

## 2024-10-02 NOTE — PROGRESS NOTES
Has the patient already received the influenza vaccine this season?  NO     Is the patient LESS than 6 months in age?  NO     Does the patient have an allergy to the influenza vaccine?  NO     Has the patient received a solid organ transplant in the past 3 months?  NO     Has the patient had anaphylaxis to gelatin or eggs?  NO     Does the patient have an allergy to Gentamicin?  NO     Has the patient been diagnosed with Guillain-[place] within 6 weeks after a previous flu vaccine?  NO

## 2024-10-07 ENCOUNTER — LAB (OUTPATIENT)
Dept: LAB | Facility: LAB | Age: 39
End: 2024-10-07
Payer: COMMERCIAL

## 2024-10-07 ENCOUNTER — TREATMENT (OUTPATIENT)
Dept: OCCUPATIONAL THERAPY | Facility: HOSPITAL | Age: 39
End: 2024-10-07
Payer: COMMERCIAL

## 2024-10-07 ENCOUNTER — HOSPITAL ENCOUNTER (OUTPATIENT)
Dept: RADIOLOGY | Facility: CLINIC | Age: 39
Discharge: HOME | End: 2024-10-07
Payer: COMMERCIAL

## 2024-10-07 VITALS — WEIGHT: 181 LBS | HEIGHT: 63 IN | BODY MASS INDEX: 32.07 KG/M2

## 2024-10-07 DIAGNOSIS — S69.91XA WRIST INJURY, RIGHT, INITIAL ENCOUNTER: ICD-10-CM

## 2024-10-07 DIAGNOSIS — R40.0 DAYTIME SLEEPINESS: ICD-10-CM

## 2024-10-07 DIAGNOSIS — Z12.31 BREAST CANCER SCREENING BY MAMMOGRAM: ICD-10-CM

## 2024-10-07 DIAGNOSIS — D64.9 ANEMIA, UNSPECIFIED TYPE: Primary | ICD-10-CM

## 2024-10-07 DIAGNOSIS — Z00.00 ANNUAL PHYSICAL EXAM: ICD-10-CM

## 2024-10-07 LAB
ALBUMIN SERPL BCP-MCNC: 4.4 G/DL (ref 3.4–5)
ALP SERPL-CCNC: 58 U/L (ref 33–110)
ALT SERPL W P-5'-P-CCNC: 31 U/L (ref 7–45)
ANION GAP SERPL CALC-SCNC: 12 MMOL/L (ref 10–20)
AST SERPL W P-5'-P-CCNC: 25 U/L (ref 9–39)
BASOPHILS # BLD AUTO: 0.04 X10*3/UL (ref 0–0.1)
BASOPHILS NFR BLD AUTO: 0.4 %
BILIRUB SERPL-MCNC: 0.3 MG/DL (ref 0–1.2)
BUN SERPL-MCNC: 9 MG/DL (ref 6–23)
CALCIUM SERPL-MCNC: 9.2 MG/DL (ref 8.6–10.3)
CHLORIDE SERPL-SCNC: 103 MMOL/L (ref 98–107)
CHOLEST SERPL-MCNC: 187 MG/DL (ref 0–199)
CHOLEST/HDLC SERPL: 4.1 {RATIO}
CO2 SERPL-SCNC: 27 MMOL/L (ref 21–32)
CREAT SERPL-MCNC: 0.82 MG/DL (ref 0.5–1.05)
EGFRCR SERPLBLD CKD-EPI 2021: >90 ML/MIN/1.73M*2
EOSINOPHIL # BLD AUTO: 0.22 X10*3/UL (ref 0–0.7)
EOSINOPHIL NFR BLD AUTO: 2.3 %
ERYTHROCYTE [DISTWIDTH] IN BLOOD BY AUTOMATED COUNT: 11.9 % (ref 11.5–14.5)
GLUCOSE SERPL-MCNC: 108 MG/DL (ref 74–99)
HCT VFR BLD AUTO: 34.5 % (ref 36–46)
HDLC SERPL-MCNC: 45.3 MG/DL
HGB BLD-MCNC: 11.7 G/DL (ref 12–16)
IMM GRANULOCYTES # BLD AUTO: 0.02 X10*3/UL (ref 0–0.7)
IMM GRANULOCYTES NFR BLD AUTO: 0.2 % (ref 0–0.9)
LDLC SERPL CALC-MCNC: 99 MG/DL
LYMPHOCYTES # BLD AUTO: 1.95 X10*3/UL (ref 1.2–4.8)
LYMPHOCYTES NFR BLD AUTO: 20.7 %
MCH RBC QN AUTO: 28.4 PG (ref 26–34)
MCHC RBC AUTO-ENTMCNC: 33.9 G/DL (ref 32–36)
MCV RBC AUTO: 84 FL (ref 80–100)
MONOCYTES # BLD AUTO: 0.54 X10*3/UL (ref 0.1–1)
MONOCYTES NFR BLD AUTO: 5.7 %
NEUTROPHILS # BLD AUTO: 6.64 X10*3/UL (ref 1.2–7.7)
NEUTROPHILS NFR BLD AUTO: 70.7 %
NON HDL CHOLESTEROL: 142 MG/DL (ref 0–149)
NRBC BLD-RTO: ABNORMAL /100{WBCS}
PLATELET # BLD AUTO: 274 X10*3/UL (ref 150–450)
POTASSIUM SERPL-SCNC: 4.2 MMOL/L (ref 3.5–5.3)
PROT SERPL-MCNC: 6.7 G/DL (ref 6.4–8.2)
RBC # BLD AUTO: 4.12 X10*6/UL (ref 4–5.2)
SODIUM SERPL-SCNC: 138 MMOL/L (ref 136–145)
TRIGL SERPL-MCNC: 215 MG/DL (ref 0–149)
TSH SERPL-ACNC: 3.68 MIU/L (ref 0.44–3.98)
VLDL: 43 MG/DL (ref 0–40)
WBC # BLD AUTO: 9.4 X10*3/UL (ref 4.4–11.3)

## 2024-10-07 PROCEDURE — 85025 COMPLETE CBC W/AUTO DIFF WBC: CPT

## 2024-10-07 PROCEDURE — 77067 SCR MAMMO BI INCL CAD: CPT

## 2024-10-07 PROCEDURE — 77067 SCR MAMMO BI INCL CAD: CPT | Performed by: RADIOLOGY

## 2024-10-07 PROCEDURE — 80053 COMPREHEN METABOLIC PANEL: CPT

## 2024-10-07 PROCEDURE — 84443 ASSAY THYROID STIM HORMONE: CPT

## 2024-10-07 PROCEDURE — 97110 THERAPEUTIC EXERCISES: CPT | Mod: GO | Performed by: OCCUPATIONAL THERAPIST

## 2024-10-07 PROCEDURE — 80061 LIPID PANEL: CPT

## 2024-10-07 PROCEDURE — 97140 MANUAL THERAPY 1/> REGIONS: CPT | Mod: GO | Performed by: OCCUPATIONAL THERAPIST

## 2024-10-07 PROCEDURE — 36415 COLL VENOUS BLD VENIPUNCTURE: CPT

## 2024-10-07 PROCEDURE — 77063 BREAST TOMOSYNTHESIS BI: CPT | Performed by: RADIOLOGY

## 2024-10-07 NOTE — PROGRESS NOTES
Occupational Therapy  Occupational Therapy Orthopedic Treatment    Patient Name: Tung Davis  MRN: [id number]  Today's Date: 10/7/2024       Insurance:  Visit number:  4  General:  Reason for visit: R wrist  Referred by: Dr. Ivan  Time:   Time:  Time Calculation  Start Time: 0900  Stop Time: 0945  Time Calculation (min): 45 min  OT Therapeutic Procedures Time Entry  Manual Therapy Time Entry: 10  Therapeutic Exercise Time Entry: 30    Insurance Type: Payor: MEDICAL [facility] / Plan: [employer]ICAL [employer] / Product Type: *No Product type* /     Current Problem  1. Wrist injury, right, initial encounter  Follow Up In Occupational Therapy          Precautions: per post op protocol     Medical History Form: Reviewed (scanned into chart)    Subjective:  It is slow. The pain is more sharp now.  Chief Complaint: R wrist  MIREYA: Fall   DOI: 08/06/2024  DOS: 08/15/2024; ORIF right distal radius fracture     Hand Dominance: Right    Current Condition since injury:   same     PAIN     Location: dorsal wrist  Intensity: 0/10 up to 2/10  Description: aching  Aggravating Factors: functional use  Relieving Factors:  Rest     Relevant Information (PMH & Previous Tests/Imaging): reviewed in chart    Prior Level of Function (PLOF)  Exercise/Physical Activity: crossfit, swimming, running  Work/School: physician  Current ADL/IADL Status: independent with homemaking     Patients Living Environment: Reviewed and no concern    Primary Language: English    Pt goals for therapy: improve functional use for cooking, cleaning, dressing, crossfit, care for kids, opening jars, cooking, work tasks, and various household chores    Red Flags: Do you have any of the following? No  Fever/chills, unexplained weight changes, dizziness/fainting, unexplained change in bowel or bladder functions, unexplained malaise or muscle weakness, night pain/sweats, numbness or tingling    Objective:    Distance to DPC  2nd: 0 cm   3rd: 0 cm    4th: 0 cm    5th: 0 cm    Thumb opposition to base of 5th   Wrist extension/flexion 38 /25    Wrist ulnar/radial deviation  25 /15  Forearm pronation/supination 80/70      Physical Observation: patient presented in prefab wrist splint - discontinued, mild edema along dorsal wrist    Numbness/Tingling: denies numbness/tingling      Outcome Measures:  Quick DASH: 47.72    EDUCATION: home exercise program, plan of care, activity modifications, pain management, and injury pathology     Good rehab potential    Goals:  Active       OT Goals       OT Goal 1       Start:  09/04/24    Expected End:  10/09/24       Achieve composite fist for grasping objects in hand.         OT Goal 2       Start:  09/04/24    Expected End:  10/09/24       Improve forearm supination to 70 degrees for dressing and driving.         OT Goal 3       Start:  09/04/24    Expected End:  11/06/24       Improve Quick DASH to 15%.         OT Goal 4       Start:  09/04/24    Expected End:  11/06/24       Report max pain of 1/10 for crossfit.         OT Goal 5       Start:  09/04/24    Expected End:  11/06/24       Improve  strength to 80% of unaffected side for lifting heavier household objects.           Plan of care was developed with input and agreement by the patient    Treatments:     Heating pad applied to circumferential wrist. (5 min)    Therapeutic Exercise:   30 min  HEP education and completion: hook fist, straight fist, composite fist,  AARAOM wrist extension and flexion, AAROM wrist ulnar and radial deviation, AAROM forearm pronation and supination, tenodesis wrist, radial and ulnar deviation, forearm pronation and supination  Wrist maze for improved dynamic wrist range of motion.  Active DTM.  Wrist flexion stretch, wrist extension stretch, and prayer stretch.  Red putty grasping, pinching, and thumb flexion.  Flexion bar wrist extension, flexion, forearm pronation and supination.  Added wrist strengthening: wrist extension, DTM, ulnar deviation,  putty grasping, pinching, and thumb flexion      Manual Therapy    10 minutes  Therapist performed manual wrist stretches: wrist flexion, extension, ulnar deviation, and radial deviation.  Manual scar mobilization.    Assessment:      Progressed home program. Patient had improved range of wrist range of motion. Patient doing well overall. Per patient request patient to follow up in three weeks. Patient to working on end range stretches.    Plan:     Planned Interventions include: therapeutic exercise, therapeutic activity, self-care home management, manual therapy, therapeutic activities, gait training, neuromuscular coordination, vasopneumatic, dry needling, aquatic therapy, electric stimulation, fluidotherapy, ultrasound, kinesiotaping, orthosis fabrication, wound care  Frequency: 1 x Week  Duration: 10 weeks      Samson Aguilar, OT

## 2024-10-09 ENCOUNTER — APPOINTMENT (OUTPATIENT)
Dept: OBSTETRICS AND GYNECOLOGY | Facility: CLINIC | Age: 39
End: 2024-10-09
Payer: COMMERCIAL

## 2024-10-09 VITALS
HEIGHT: 63 IN | SYSTOLIC BLOOD PRESSURE: 117 MMHG | BODY MASS INDEX: 31.54 KG/M2 | WEIGHT: 178 LBS | DIASTOLIC BLOOD PRESSURE: 78 MMHG

## 2024-10-09 DIAGNOSIS — Z30.430 ENCOUNTER FOR INSERTION OF INTRAUTERINE CONTRACEPTIVE DEVICE (IUD): Primary | ICD-10-CM

## 2024-10-09 LAB — PREGNANCY TEST URINE, POC: NEGATIVE

## 2024-10-09 PROCEDURE — 81025 URINE PREGNANCY TEST: CPT | Performed by: OBSTETRICS & GYNECOLOGY

## 2024-10-09 PROCEDURE — 58300 INSERT INTRAUTERINE DEVICE: CPT | Performed by: OBSTETRICS & GYNECOLOGY

## 2024-10-09 ASSESSMENT — PAIN SCALES - GENERAL: PAINLEVEL: 0-NO PAIN

## 2024-10-09 NOTE — PROGRESS NOTES
"Patient ID: Tung Davis is a 39 y.o. female.  Pt presents for IUD placement. Pt previously had liletta IUD.  Removed several months ago to see if it would help with weight loss.  She prefers to have LARC and returns today for Mirena IUD.   Procedure reviewed. All questions answered.     Visit Vitals  /78 (BP Location: Left arm, Patient Position: Sitting)   Ht 1.6 m (5' 3\")   Wt 80.7 kg (178 lb)   LMP 09/23/2024 (Approximate)   BMI 31.53 kg/m²   OB Status Having periods   Smoking Status Former   BSA 1.89 m²   .      IUD Insertion    Performed by: Meghan BEDOYA MD  Authorized by: Meghan BEDOYA MD    Procedure: IUD insertion    Consent obtained by patient, parent, or legal power of  - including discussion of procedure risks and benefits, patient questions answered, and patient education provided: yes    Pregnancy risk: reasonably certain the patient is not pregnant    Date/Time of Insertion:  10/9/2024 11:47 AM  Immediately prior to procedure a time out was called: yes    Speculum placed in vagina: yes    Cervix cleaned and prepped: yes    Tenaculum/Allis/Ring Forceps applied to cervix: yes    Uterus sound depth (cm):  7  Cervix manually dilated: no    IUD inserted without complications: yes    OSM: levonorgestrel 21 mcg/24 hr (8 yrs) 52 mg  Strings trimmed to (cm):  2  Patient tolerated procedure well: yes    Intended removal date: 8 years        IUD placed without complication.  Pt tolerated procedure well.   Discussed possible irregular bleeding for up to 3 months.    Discussed possible cramping for up to a few days.  May use tylenol and/or ibuprofen as needed.   Use backup method of birth control x 7 days.   Please return for follow up exam in 6-8 weeks.       "

## 2024-10-29 ENCOUNTER — TREATMENT (OUTPATIENT)
Dept: OCCUPATIONAL THERAPY | Facility: HOSPITAL | Age: 39
End: 2024-10-29
Payer: COMMERCIAL

## 2024-10-29 ENCOUNTER — OFFICE VISIT (OUTPATIENT)
Dept: ORTHOPEDIC SURGERY | Facility: HOSPITAL | Age: 39
End: 2024-10-29
Payer: COMMERCIAL

## 2024-10-29 ENCOUNTER — HOSPITAL ENCOUNTER (OUTPATIENT)
Dept: RADIOLOGY | Facility: HOSPITAL | Age: 39
Discharge: HOME | End: 2024-10-29
Payer: COMMERCIAL

## 2024-10-29 VITALS — HEIGHT: 63 IN | BODY MASS INDEX: 31.54 KG/M2 | WEIGHT: 178 LBS

## 2024-10-29 DIAGNOSIS — S69.91XA WRIST INJURY, RIGHT, INITIAL ENCOUNTER: ICD-10-CM

## 2024-10-29 DIAGNOSIS — S69.91XA WRIST INJURY, RIGHT, INITIAL ENCOUNTER: Primary | ICD-10-CM

## 2024-10-29 PROCEDURE — 1036F TOBACCO NON-USER: CPT | Performed by: STUDENT IN AN ORGANIZED HEALTH CARE EDUCATION/TRAINING PROGRAM

## 2024-10-29 PROCEDURE — 3008F BODY MASS INDEX DOCD: CPT | Performed by: STUDENT IN AN ORGANIZED HEALTH CARE EDUCATION/TRAINING PROGRAM

## 2024-10-29 PROCEDURE — 99211 OFF/OP EST MAY X REQ PHY/QHP: CPT | Performed by: STUDENT IN AN ORGANIZED HEALTH CARE EDUCATION/TRAINING PROGRAM

## 2024-10-29 PROCEDURE — 73110 X-RAY EXAM OF WRIST: CPT | Mod: RIGHT SIDE | Performed by: RADIOLOGY

## 2024-10-29 PROCEDURE — 97140 MANUAL THERAPY 1/> REGIONS: CPT | Mod: GO | Performed by: OCCUPATIONAL THERAPIST

## 2024-10-29 PROCEDURE — 97110 THERAPEUTIC EXERCISES: CPT | Mod: GO | Performed by: OCCUPATIONAL THERAPIST

## 2024-10-29 PROCEDURE — 73110 X-RAY EXAM OF WRIST: CPT | Mod: RT

## 2024-10-29 ASSESSMENT — PAIN SCALES - GENERAL: PAINLEVEL_OUTOF10: 5 - MODERATE PAIN

## 2024-10-29 ASSESSMENT — PAIN - FUNCTIONAL ASSESSMENT: PAIN_FUNCTIONAL_ASSESSMENT: 0-10

## 2024-10-29 ASSESSMENT — PAIN DESCRIPTION - DESCRIPTORS: DESCRIPTORS: ACHING;SORE

## 2024-10-30 ENCOUNTER — CLINICAL SUPPORT (OUTPATIENT)
Dept: SLEEP MEDICINE | Facility: CLINIC | Age: 39
End: 2024-10-30
Payer: COMMERCIAL

## 2024-10-30 DIAGNOSIS — G47.9 SLEEP DISORDER, UNSPECIFIED: ICD-10-CM

## 2024-10-30 DIAGNOSIS — R40.0 DAYTIME SLEEPINESS: ICD-10-CM

## 2024-10-30 PROCEDURE — 95806 SLEEP STUDY UNATT&RESP EFFT: CPT | Performed by: STUDENT IN AN ORGANIZED HEALTH CARE EDUCATION/TRAINING PROGRAM

## 2024-11-20 ENCOUNTER — APPOINTMENT (OUTPATIENT)
Dept: OBSTETRICS AND GYNECOLOGY | Facility: CLINIC | Age: 39
End: 2024-11-20
Payer: COMMERCIAL

## 2024-11-20 VITALS
DIASTOLIC BLOOD PRESSURE: 82 MMHG | WEIGHT: 185 LBS | SYSTOLIC BLOOD PRESSURE: 126 MMHG | HEIGHT: 63 IN | BODY MASS INDEX: 32.78 KG/M2

## 2024-11-20 DIAGNOSIS — Z30.431 ENCOUNTER FOR ROUTINE CHECKING OF INTRAUTERINE CONTRACEPTIVE DEVICE (IUD): Primary | ICD-10-CM

## 2024-11-20 PROCEDURE — 99213 OFFICE O/P EST LOW 20 MIN: CPT | Performed by: OBSTETRICS & GYNECOLOGY

## 2024-11-20 PROCEDURE — 3008F BODY MASS INDEX DOCD: CPT | Performed by: OBSTETRICS & GYNECOLOGY

## 2024-11-20 ASSESSMENT — ENCOUNTER SYMPTOMS
NAUSEA: 0
FLANK PAIN: 0
VOMITING: 0
APPETITE CHANGE: 0
CONSTIPATION: 0
BACK PAIN: 0
BLOOD IN STOOL: 0
CHILLS: 0
FEVER: 0
FATIGUE: 0
UNEXPECTED WEIGHT CHANGE: 0
ABDOMINAL DISTENTION: 0
HEMATURIA: 0
ABDOMINAL PAIN: 0
DYSURIA: 0
FREQUENCY: 0
SHORTNESS OF BREATH: 0
SLEEP DISTURBANCE: 0
COLOR CHANGE: 0
DIARRHEA: 0

## 2024-11-20 ASSESSMENT — PAIN SCALES - GENERAL: PAINLEVEL_OUTOF10: 0-NO PAIN

## 2024-11-20 NOTE — PROGRESS NOTES
"Tung Davis is a 39 y.o.  here for follow up after IUD placement / IUD surveillance.    Date of IUD placement: 10/9/24  Type of IUD: Mirena     Concerns since placement: no concerns    Spotting or bleeding: spotting off and on  Pain: none    Obstetric History  OB History    Para Term  AB Living   3 3 3     3   SAB IAB Ectopic Multiple Live Births                  # Outcome Date GA Lbr Yan/2nd Weight Sex Type Anes PTL Lv   3 Term            2 Term            1 Term                 Past Medical History  She has a past medical history of Anxiety, Depression, Fractures, Ganglion, right wrist (2022), and Other specified anxiety disorders (2022).    Surgical History  She has a past surgical history that includes Other surgical history (Right, 2018) and  section, low transverse (2014).     Social History  She reports that she has quit smoking. Her smoking use included cigarettes. She has never used smokeless tobacco. She reports current alcohol use. She reports that she does not use drugs.    Family History  Family History   Problem Relation Name Age of Onset    Asthma Mother Mom          /82 (BP Location: Right arm, Patient Position: Sitting)   Ht 1.6 m (5' 3\")   Wt 83.9 kg (185 lb)   LMP 10/29/2024 (Approximate)   BMI 32.77 kg/m²   Patient's last menstrual period was 10/29/2024 (approximate).    Review of Systems   Constitutional:  Negative for appetite change, chills, fatigue, fever and unexpected weight change.   Respiratory:  Negative for shortness of breath.    Cardiovascular:  Negative for chest pain.   Gastrointestinal:  Negative for abdominal distention, abdominal pain, blood in stool, constipation, diarrhea, nausea and vomiting.   Endocrine: Negative for cold intolerance and heat intolerance.   Genitourinary:  Negative for dyspareunia, dysuria, flank pain, frequency, genital sores, hematuria, menstrual problem, pelvic pain, urgency, vaginal bleeding, vaginal " discharge and vaginal pain.   Musculoskeletal:  Negative for back pain.   Skin:  Negative for color change.   Psychiatric/Behavioral:  Negative for sleep disturbance.        Physical Exam  Constitutional:       Appearance: Normal appearance.   Abdominal:      General: Abdomen is flat.      Palpations: Abdomen is soft.      Tenderness: There is no abdominal tenderness.   Genitourinary:     General: Normal vulva.      Vagina: Normal.      Cervix: Normal.      Uterus: Normal.       Adnexa: Right adnexa normal and left adnexa normal.      Comments: +IUD thread seen    Skin:     General: Skin is warm and dry.   Neurological:      General: No focal deficit present.      Mental Status: She is alert.   Psychiatric:         Mood and Affect: Mood normal.         Behavior: Behavior normal.         Thought Content: Thought content normal.         Judgment: Judgment normal.           Assessment and Plan:    Reviewed possible bleeding patterns and side effects over the lifetime of the IUD.  Encouraged pt to wait 6-12 months before making any decisions about removal if not completely happy with IUD today.   Reviewed length of time IUD effective for symptoms and birth control.   Routine IUD surveillance recommended with annual GYN visits or if any problems.

## 2024-12-20 ENCOUNTER — APPOINTMENT (OUTPATIENT)
Dept: GASTROENTEROLOGY | Facility: EXTERNAL LOCATION | Age: 39
End: 2024-12-20
Payer: COMMERCIAL

## 2024-12-20 DIAGNOSIS — Z83.719 FAMILY HISTORY OF COLONIC POLYPS: Primary | ICD-10-CM

## 2024-12-20 DIAGNOSIS — Z12.11 SPECIAL SCREENING FOR MALIGNANT NEOPLASMS, COLON: ICD-10-CM

## 2024-12-20 DIAGNOSIS — Z80.0 FAMILY HISTORY OF COLON CANCER: ICD-10-CM

## 2024-12-20 PROCEDURE — G0105 COLORECTAL SCRN; HI RISK IND: HCPCS | Performed by: INTERNAL MEDICINE

## 2025-02-03 ENCOUNTER — OFFICE VISIT (OUTPATIENT)
Dept: ORTHOPEDIC SURGERY | Facility: HOSPITAL | Age: 40
End: 2025-02-03
Payer: COMMERCIAL

## 2025-02-03 DIAGNOSIS — T84.84XA PAINFUL ORTHOPAEDIC HARDWARE (CMS-HCC): Primary | ICD-10-CM

## 2025-02-03 DIAGNOSIS — M65.4 DE QUERVAIN'S TENOSYNOVITIS: ICD-10-CM

## 2025-02-03 PROCEDURE — 99213 OFFICE O/P EST LOW 20 MIN: CPT | Performed by: STUDENT IN AN ORGANIZED HEALTH CARE EDUCATION/TRAINING PROGRAM

## 2025-02-03 PROCEDURE — 1036F TOBACCO NON-USER: CPT | Performed by: STUDENT IN AN ORGANIZED HEALTH CARE EDUCATION/TRAINING PROGRAM

## 2025-02-03 ASSESSMENT — PAIN SCALES - GENERAL: PAINLEVEL_OUTOF10: 3

## 2025-02-03 ASSESSMENT — PAIN - FUNCTIONAL ASSESSMENT: PAIN_FUNCTIONAL_ASSESSMENT: 0-10

## 2025-02-03 NOTE — PROGRESS NOTES
History of Present Illness   Patient presents for follow-up.  She underwent an ORIF of her right intra-articular distal radius fracture on 8/27/2024.  She did attend occupational therapy but notes continued stiffness to her wrist.  She enjoys working out and CrossFit and states this is limiting her.  She recently started occupational therapy with a new therapy group which she has noticed some improvement.  She also notes continued pain to her wrist with certain movements and activities or when her child grabs her hand and wrist.  Prior to this injury she had de Quervain's tenosynovitis and had previous 2 corticosteroid injections with good temporary relief.  She notes that her pain is global to the wrist but also more significant to the radial side.  She denies any numbness or tingling.  She denies any fevers or chills.    At this point she is 6 months out and notes continued limited motion and continued pain.  She has discussed interest in removing her orthopedic hardware.     Past Medical History:   Diagnosis Date    Anxiety     Depression     Fractures     Ganglion, right wrist 01/24/2022    Ganglion of right wrist    Other specified anxiety disorders 01/24/2022    Anxiety with depression       Medication Documentation Review Audit       Reviewed by Nisha Hubbard LPN (Licensed Nurse) on 02/03/25 at 0908      Medication Order Taking? Sig Documenting Provider Last Dose Status   escitalopram (Lexapro) 10 mg tablet [id number]  Take 1 tablet (10 mg) by mouth once daily. Annemarie Nj MD  Active   polyethylene glycol (GoLYTELY) 236-22.74-6.74 -5.86 gram solution [id number]  Drink 1/2 starting at 6 pm the night before your procedure then drink the 2nd 1/2 5 hours before procedure arrival time Sherrie Cisneros MD  Active                    No Known Allergies    Social History     Socioeconomic History    Marital status:      Spouse name: Not on file    Number of children: Not on file    Years of education:  Not on file    Highest education level: Not on file   Occupational History    Not on file   Tobacco Use    Smoking status: Former     Types: Cigarettes    Smokeless tobacco: Never   Vaping Use    Vaping status: Never Used   Substance and Sexual Activity    Alcohol use: Yes     Comment: rare    Drug use: Never    Sexual activity: Yes     Partners: Male     Birth control/protection: Condom Male   Other Topics Concern    Not on file   Social History Narrative    Not on file     Social Drivers of Health     Financial Resource Strain: Not on file   Food Insecurity: Not on file   Transportation Needs: Not on file   Physical Activity: Not on file   Stress: Not on file   Social Connections: Not on file   Intimate Partner Violence: Not on file   Housing Stability: Not on file       Past Surgical History:   Procedure Laterality Date     SECTION, LOW TRANSVERSE  2014    OTHER SURGICAL HISTORY Right 2018    Wrist surgery          Review of Systems   GENERAL: Negative  GI: Negative  MUSCULOSKELETAL: See HPI  SKIN: Negative  NEURO:  Negative     Physical Exam:  side: right upper extremity:  Skin healthy to gross inspection, no breakdown  Well-healed incision over the volar side of the wrist over the FCR tendon.  There is no erythema warmth or drainage.  Mild swelling noted to the right wrist.  No ecchymosis is present.  Tender to ovation over the volar radial side of the wrist at the plate.  Tender to palpation over the first dorsal compartment.  Positive Finkelstein's test.  Pain with resisted thumb extension.  Intact flexion and extension of 1st IP joint and finger abduction  Limited wrist extension at approximately 40 degrees.  Sensation intact to light touch medial / ulnar and radial nerve distribution   Good cap refill     Imaging  XR of the right wrist was reviewed in office today dated 10/29/2024  Healed distal radius fracture in satisfactory alignment.     Assessment   Painful orthopedic hardware from a right  volar locking plate.  De Quervain's tenosynovitis     Plan:  We discussed that her hardware is causing her symptomatic pain.  We also discussed that she has first dorsal compartment tendinitis.  She has had 2 previous injections prior to this recent injury with short-term temporary relief.  At this point we discussed removing her orthopedic hardware and doing a first dorsal compartment release.  She would like to proceed with this.  All questions were answered.     For Surgical Planning:  Diagnosis: Painful orthopedic hardware right wrist and de Quervain's tenosynovitis  Procedure: Removal of orthopedic hardware right wrist and first dorsal apartment release  CPT: 02480, 61555  Anesthesia: General  Duration: 60 minutes  Special Equipment Needed: Skeletal dynamics volar locking plate set and mini C-arm  Medical Notes / PM / DM / PAT needed?:  No  Location: Anywhere  Initial Post Operative Visit: 2 weeks

## 2025-02-07 DIAGNOSIS — T84.84XA PAINFUL ORTHOPAEDIC HARDWARE (CMS-HCC): ICD-10-CM

## 2025-02-10 ENCOUNTER — ANESTHESIA EVENT (OUTPATIENT)
Dept: OPERATING ROOM | Facility: CLINIC | Age: 40
End: 2025-02-10
Payer: COMMERCIAL

## 2025-02-12 ENCOUNTER — HOSPITAL ENCOUNTER (OUTPATIENT)
Dept: RADIOLOGY | Facility: CLINIC | Age: 40
Discharge: HOME | End: 2025-02-12
Payer: COMMERCIAL

## 2025-02-12 ENCOUNTER — ANESTHESIA (OUTPATIENT)
Dept: OPERATING ROOM | Facility: CLINIC | Age: 40
End: 2025-02-12
Payer: COMMERCIAL

## 2025-02-12 ENCOUNTER — HOSPITAL ENCOUNTER (OUTPATIENT)
Facility: CLINIC | Age: 40
Setting detail: OUTPATIENT SURGERY
Discharge: HOME | End: 2025-02-12
Attending: STUDENT IN AN ORGANIZED HEALTH CARE EDUCATION/TRAINING PROGRAM | Admitting: STUDENT IN AN ORGANIZED HEALTH CARE EDUCATION/TRAINING PROGRAM
Payer: COMMERCIAL

## 2025-02-12 VITALS
RESPIRATION RATE: 16 BRPM | BODY MASS INDEX: 32.46 KG/M2 | DIASTOLIC BLOOD PRESSURE: 78 MMHG | TEMPERATURE: 96.8 F | OXYGEN SATURATION: 100 % | SYSTOLIC BLOOD PRESSURE: 122 MMHG | HEIGHT: 63 IN | WEIGHT: 183.2 LBS | HEART RATE: 77 BPM

## 2025-02-12 DIAGNOSIS — T84.84XA PAINFUL ORTHOPAEDIC HARDWARE (CMS-HCC): Primary | ICD-10-CM

## 2025-02-12 LAB — PREGNANCY TEST URINE, POC: NEGATIVE

## 2025-02-12 PROCEDURE — 20680 REMOVAL OF IMPLANT DEEP: CPT | Performed by: STUDENT IN AN ORGANIZED HEALTH CARE EDUCATION/TRAINING PROGRAM

## 2025-02-12 PROCEDURE — 2500000005 HC RX 250 GENERAL PHARMACY W/O HCPCS: Performed by: STUDENT IN AN ORGANIZED HEALTH CARE EDUCATION/TRAINING PROGRAM

## 2025-02-12 PROCEDURE — 3600000002 HC OR TIME - INITIAL BASE CHARGE - PROCEDURE LEVEL TWO: Performed by: STUDENT IN AN ORGANIZED HEALTH CARE EDUCATION/TRAINING PROGRAM

## 2025-02-12 PROCEDURE — 3600000007 HC OR TIME - EACH INCREMENTAL 1 MINUTE - PROCEDURE LEVEL TWO: Performed by: STUDENT IN AN ORGANIZED HEALTH CARE EDUCATION/TRAINING PROGRAM

## 2025-02-12 PROCEDURE — 2500000004 HC RX 250 GENERAL PHARMACY W/ HCPCS (ALT 636 FOR OP/ED): Performed by: STUDENT IN AN ORGANIZED HEALTH CARE EDUCATION/TRAINING PROGRAM

## 2025-02-12 PROCEDURE — A20680 PR REMOVAL DEEP IMPLANT: Performed by: NURSE ANESTHETIST, CERTIFIED REGISTERED

## 2025-02-12 PROCEDURE — 7100000010 HC PHASE TWO TIME - EACH INCREMENTAL 1 MINUTE: Performed by: STUDENT IN AN ORGANIZED HEALTH CARE EDUCATION/TRAINING PROGRAM

## 2025-02-12 PROCEDURE — 7100000009 HC PHASE TWO TIME - INITIAL BASE CHARGE: Performed by: STUDENT IN AN ORGANIZED HEALTH CARE EDUCATION/TRAINING PROGRAM

## 2025-02-12 PROCEDURE — 81025 URINE PREGNANCY TEST: CPT | Performed by: STUDENT IN AN ORGANIZED HEALTH CARE EDUCATION/TRAINING PROGRAM

## 2025-02-12 PROCEDURE — 2500000004 HC RX 250 GENERAL PHARMACY W/ HCPCS (ALT 636 FOR OP/ED): Performed by: NURSE ANESTHETIST, CERTIFIED REGISTERED

## 2025-02-12 PROCEDURE — 7100000002 HC RECOVERY ROOM TIME - EACH INCREMENTAL 1 MINUTE: Performed by: STUDENT IN AN ORGANIZED HEALTH CARE EDUCATION/TRAINING PROGRAM

## 2025-02-12 PROCEDURE — 7100000001 HC RECOVERY ROOM TIME - INITIAL BASE CHARGE: Performed by: STUDENT IN AN ORGANIZED HEALTH CARE EDUCATION/TRAINING PROGRAM

## 2025-02-12 PROCEDURE — 3700000001 HC GENERAL ANESTHESIA TIME - INITIAL BASE CHARGE: Performed by: STUDENT IN AN ORGANIZED HEALTH CARE EDUCATION/TRAINING PROGRAM

## 2025-02-12 PROCEDURE — 3700000002 HC GENERAL ANESTHESIA TIME - EACH INCREMENTAL 1 MINUTE: Performed by: STUDENT IN AN ORGANIZED HEALTH CARE EDUCATION/TRAINING PROGRAM

## 2025-02-12 PROCEDURE — 25000 INCISION OF TENDON SHEATH: CPT | Performed by: STUDENT IN AN ORGANIZED HEALTH CARE EDUCATION/TRAINING PROGRAM

## 2025-02-12 PROCEDURE — A20680 PR REMOVAL DEEP IMPLANT: Performed by: ANESTHESIOLOGY

## 2025-02-12 RX ORDER — ONDANSETRON HYDROCHLORIDE 2 MG/ML
4 INJECTION, SOLUTION INTRAVENOUS ONCE AS NEEDED
Status: DISCONTINUED | OUTPATIENT
Start: 2025-02-12 | End: 2025-02-12 | Stop reason: HOSPADM

## 2025-02-12 RX ORDER — SODIUM CHLORIDE, SODIUM LACTATE, POTASSIUM CHLORIDE, CALCIUM CHLORIDE 600; 310; 30; 20 MG/100ML; MG/100ML; MG/100ML; MG/100ML
INJECTION, SOLUTION INTRAVENOUS CONTINUOUS PRN
Status: DISCONTINUED | OUTPATIENT
Start: 2025-02-12 | End: 2025-02-12

## 2025-02-12 RX ORDER — LABETALOL HYDROCHLORIDE 5 MG/ML
5 INJECTION, SOLUTION INTRAVENOUS ONCE AS NEEDED
Status: DISCONTINUED | OUTPATIENT
Start: 2025-02-12 | End: 2025-02-12 | Stop reason: HOSPADM

## 2025-02-12 RX ORDER — LIDOCAINE HYDROCHLORIDE 20 MG/ML
INJECTION, SOLUTION INFILTRATION; PERINEURAL AS NEEDED
Status: DISCONTINUED | OUTPATIENT
Start: 2025-02-12 | End: 2025-02-12

## 2025-02-12 RX ORDER — FENTANYL CITRATE 50 UG/ML
25 INJECTION, SOLUTION INTRAMUSCULAR; INTRAVENOUS EVERY 5 MIN PRN
Status: DISCONTINUED | OUTPATIENT
Start: 2025-02-12 | End: 2025-02-12 | Stop reason: HOSPADM

## 2025-02-12 RX ORDER — PROPOFOL 10 MG/ML
INJECTION, EMULSION INTRAVENOUS CONTINUOUS PRN
Status: DISCONTINUED | OUTPATIENT
Start: 2025-02-12 | End: 2025-02-12

## 2025-02-12 RX ORDER — OXYCODONE HYDROCHLORIDE 5 MG/1
5 TABLET ORAL EVERY 4 HOURS PRN
Status: DISCONTINUED | OUTPATIENT
Start: 2025-02-12 | End: 2025-02-12 | Stop reason: HOSPADM

## 2025-02-12 RX ORDER — KETOROLAC TROMETHAMINE 30 MG/ML
INJECTION, SOLUTION INTRAMUSCULAR; INTRAVENOUS AS NEEDED
Status: DISCONTINUED | OUTPATIENT
Start: 2025-02-12 | End: 2025-02-12

## 2025-02-12 RX ORDER — FENTANYL CITRATE 50 UG/ML
50 INJECTION, SOLUTION INTRAMUSCULAR; INTRAVENOUS EVERY 5 MIN PRN
Status: DISCONTINUED | OUTPATIENT
Start: 2025-02-12 | End: 2025-02-12 | Stop reason: HOSPADM

## 2025-02-12 RX ORDER — ACETAMINOPHEN 325 MG/1
650 TABLET ORAL EVERY 4 HOURS PRN
Status: DISCONTINUED | OUTPATIENT
Start: 2025-02-12 | End: 2025-02-12 | Stop reason: HOSPADM

## 2025-02-12 RX ORDER — LIDOCAINE HYDROCHLORIDE 10 MG/ML
0.1 INJECTION, SOLUTION EPIDURAL; INFILTRATION; INTRACAUDAL; PERINEURAL ONCE
Status: DISCONTINUED | OUTPATIENT
Start: 2025-02-12 | End: 2025-02-12 | Stop reason: HOSPADM

## 2025-02-12 RX ORDER — METOCLOPRAMIDE HYDROCHLORIDE 5 MG/ML
10 INJECTION INTRAMUSCULAR; INTRAVENOUS ONCE AS NEEDED
Status: DISCONTINUED | OUTPATIENT
Start: 2025-02-12 | End: 2025-02-12 | Stop reason: HOSPADM

## 2025-02-12 RX ORDER — FENTANYL CITRATE 50 UG/ML
INJECTION, SOLUTION INTRAMUSCULAR; INTRAVENOUS AS NEEDED
Status: DISCONTINUED | OUTPATIENT
Start: 2025-02-12 | End: 2025-02-12

## 2025-02-12 RX ORDER — WATER 1 ML/ML
IRRIGANT IRRIGATION AS NEEDED
Status: DISCONTINUED | OUTPATIENT
Start: 2025-02-12 | End: 2025-02-12 | Stop reason: HOSPADM

## 2025-02-12 RX ORDER — HYDROCODONE BITARTRATE AND ACETAMINOPHEN 5; 325 MG/1; MG/1
1 TABLET ORAL EVERY 6 HOURS PRN
Qty: 12 TABLET | Refills: 0 | Status: SHIPPED | OUTPATIENT
Start: 2025-02-12 | End: 2025-02-15

## 2025-02-12 RX ORDER — MIDAZOLAM HYDROCHLORIDE 1 MG/ML
INJECTION, SOLUTION INTRAMUSCULAR; INTRAVENOUS AS NEEDED
Status: DISCONTINUED | OUTPATIENT
Start: 2025-02-12 | End: 2025-02-12

## 2025-02-12 RX ORDER — PROPOFOL 10 MG/ML
INJECTION, EMULSION INTRAVENOUS AS NEEDED
Status: DISCONTINUED | OUTPATIENT
Start: 2025-02-12 | End: 2025-02-12

## 2025-02-12 RX ORDER — CEFAZOLIN 1 G/1
INJECTION, POWDER, FOR SOLUTION INTRAVENOUS AS NEEDED
Status: DISCONTINUED | OUTPATIENT
Start: 2025-02-12 | End: 2025-02-12

## 2025-02-12 RX ORDER — ONDANSETRON HYDROCHLORIDE 2 MG/ML
INJECTION, SOLUTION INTRAVENOUS AS NEEDED
Status: DISCONTINUED | OUTPATIENT
Start: 2025-02-12 | End: 2025-02-12

## 2025-02-12 RX ORDER — ALBUTEROL SULFATE 0.83 MG/ML
2.5 SOLUTION RESPIRATORY (INHALATION) ONCE AS NEEDED
Status: DISCONTINUED | OUTPATIENT
Start: 2025-02-12 | End: 2025-02-12 | Stop reason: HOSPADM

## 2025-02-12 RX ORDER — DOCUSATE SODIUM 100 MG/1
100 CAPSULE, LIQUID FILLED ORAL 2 TIMES DAILY
Qty: 60 CAPSULE | Refills: 0 | Status: SHIPPED | OUTPATIENT
Start: 2025-02-12 | End: 2025-03-14

## 2025-02-12 RX ADMIN — FENTANYL CITRATE 25 MCG: 0.05 INJECTION, SOLUTION INTRAMUSCULAR; INTRAVENOUS at 10:40

## 2025-02-12 RX ADMIN — SODIUM CHLORIDE, POTASSIUM CHLORIDE, SODIUM LACTATE AND CALCIUM CHLORIDE: 600; 310; 30; 20 INJECTION, SOLUTION INTRAVENOUS at 10:10

## 2025-02-12 RX ADMIN — PROPOFOL 100 MCG/KG/MIN: 10 INJECTION, EMULSION INTRAVENOUS at 10:21

## 2025-02-12 RX ADMIN — KETOROLAC TROMETHAMINE 30 MG: 30 INJECTION, SOLUTION INTRAMUSCULAR; INTRAVENOUS at 11:17

## 2025-02-12 RX ADMIN — CEFAZOLIN 2 G: 1 INJECTION, POWDER, FOR SOLUTION INTRAMUSCULAR; INTRAVENOUS at 10:19

## 2025-02-12 RX ADMIN — ONDANSETRON 4 MG: 2 INJECTION INTRAMUSCULAR; INTRAVENOUS at 11:17

## 2025-02-12 RX ADMIN — FENTANYL CITRATE 25 MCG: 0.05 INJECTION, SOLUTION INTRAMUSCULAR; INTRAVENOUS at 10:12

## 2025-02-12 RX ADMIN — PROPOFOL 200 MG: 10 INJECTION, EMULSION INTRAVENOUS at 10:12

## 2025-02-12 RX ADMIN — FENTANYL CITRATE 25 MCG: 0.05 INJECTION, SOLUTION INTRAMUSCULAR; INTRAVENOUS at 10:24

## 2025-02-12 RX ADMIN — MIDAZOLAM 2 MG: 1 INJECTION INTRAMUSCULAR; INTRAVENOUS at 10:12

## 2025-02-12 RX ADMIN — DEXAMETHASONE SODIUM PHOSPHATE 5 MG: 4 INJECTION INTRA-ARTICULAR; INTRALESIONAL; INTRAMUSCULAR; INTRAVENOUS; SOFT TISSUE at 10:12

## 2025-02-12 RX ADMIN — LIDOCAINE HYDROCHLORIDE 100 MG: 20 INJECTION, SOLUTION INFILTRATION; PERINEURAL at 10:12

## 2025-02-12 RX ADMIN — FENTANYL CITRATE 25 MCG: 0.05 INJECTION, SOLUTION INTRAMUSCULAR; INTRAVENOUS at 10:57

## 2025-02-12 SDOH — HEALTH STABILITY: MENTAL HEALTH: CURRENT SMOKER: 0

## 2025-02-12 ASSESSMENT — COLUMBIA-SUICIDE SEVERITY RATING SCALE - C-SSRS
1. IN THE PAST MONTH, HAVE YOU WISHED YOU WERE DEAD OR WISHED YOU COULD GO TO SLEEP AND NOT WAKE UP?: NO
6. HAVE YOU EVER DONE ANYTHING, STARTED TO DO ANYTHING, OR PREPARED TO DO ANYTHING TO END YOUR LIFE?: NO
2. HAVE YOU ACTUALLY HAD ANY THOUGHTS OF KILLING YOURSELF?: NO

## 2025-02-12 ASSESSMENT — PAIN SCALES - GENERAL
PAIN_LEVEL: 0
PAINLEVEL_OUTOF10: 0 - NO PAIN
PAINLEVEL_OUTOF10: 1

## 2025-02-12 ASSESSMENT — PAIN - FUNCTIONAL ASSESSMENT
PAIN_FUNCTIONAL_ASSESSMENT: UNABLE TO SELF-REPORT
PAIN_FUNCTIONAL_ASSESSMENT: 0-10
PAIN_FUNCTIONAL_ASSESSMENT: 0-10

## 2025-02-12 ASSESSMENT — ENCOUNTER SYMPTOMS
CARDIOVASCULAR NEGATIVE: 1
CONSTITUTIONAL NEGATIVE: 1
GASTROINTESTINAL NEGATIVE: 1
RESPIRATORY NEGATIVE: 1
EYES NEGATIVE: 1

## 2025-02-12 NOTE — ANESTHESIA POSTPROCEDURE EVALUATION
Patient: Tung Davis    Procedure Summary       Date: 02/12/25 Room / Location: [facility] OR 04 / [facility]    Anesthesia Start: 1010 Anesthesia Stop: 1148    Procedure: Removal of orthopedic hardware right wrist and first dorsal apartment release / 60 mins (Right: Wrist) Diagnosis:       Painful orthopaedic hardware (CMS-HCC)      (Painful orthopaedic hardware (CMS-HCC) [T84.84XA])    Surgeons: Natalio Ivan DO Responsible Provider: Ish Fairbanks MD    Anesthesia Type: general ASA Status: 2            Anesthesia Type: general    Vitals Value Taken Time   /63 02/12/25 1155   Temp 36 °C (96.8 °F) 02/12/25 1140   Pulse 72 02/12/25 1155   Resp 16 02/12/25 1155   SpO2 100 % 02/12/25 1155       Anesthesia Post Evaluation    Patient location during evaluation: PACU  Patient participation: complete - patient participated  Level of consciousness: awake  Pain score: 0  Pain management: adequate  Airway patency: patent  Cardiovascular status: acceptable  Respiratory status: acceptable  Hydration status: acceptable  Postoperative Nausea and Vomiting: none        There were no known notable events for this encounter.

## 2025-02-12 NOTE — H&P
"History Of Present Illness  Tung Davis is a 39 y.o. female presenting with hx of ORIF right distal radius fracture with hardware iritation.     Past Medical History  She has a past medical history of Anxiety, Closed fracture distal radius and ulna, Depression, Fractures, Ganglion, right wrist (2022), Melanocytic nevi of trunk, and Other specified anxiety disorders (2022).    Surgical History  She has a past surgical history that includes Other surgical history (Right, 2018) and  section, low transverse (2014).     Social History  She reports that she has quit smoking. Her smoking use included cigarettes. She has never used smokeless tobacco. She reports current alcohol use. She reports that she does not use drugs.    Family History  Family History   Problem Relation Name Age of Onset    Asthma Mother Mom         Allergies  Patient has no known allergies.    Review of Systems   Constitutional: Negative.    HENT: Negative.     Eyes: Negative.    Respiratory: Negative.     Cardiovascular: Negative.    Gastrointestinal: Negative.    Musculoskeletal:         Irritation of right wrist hardware        Physical Exam  GEN - NAD, resting comfortably in hospital bed  HEENT - MMM, EOMI, NCAT  CV - RRR by peripheral palpation, limbs wwp  PULM - NWOB on RA  NEURO - RODRIGUEZ spontaneously  PSYCH - Appropriate mood and affect  MSK - Incision over volar right wrist well healed        Last Recorded Vitals  Blood pressure 132/73, pulse 87, temperature 36.4 °C (97.5 °F), temperature source Temporal, resp. rate 16, height 1.6 m (5' 3\"), weight 83.1 kg (183 lb 3.2 oz), SpO2 97%.    Relevant Results      Scheduled medications    Continuous medications    PRN medications    Results for orders placed or performed during the hospital encounter of 25 (from the past 24 hours)   POCT pregnancy, urine   Result Value Ref Range    Preg Test, Ur Negative Negative       Assessment/Plan   Assessment & Plan  Painful " orthopaedic hardware (CMS-[facility])      Tung Davis is a 39 y.o. female with hx of ORIF right distal radius fracture with hardware iritation. Plan to proceed with removal of right wrist volar plate today.            Ulises Rowley MD

## 2025-02-12 NOTE — DISCHARGE INSTRUCTIONS
Post-Operative Instructions   Dr. Ntaalio Deyucler   [phone]     Dressing: You have a bandage or splint covering your operative site:      You may remove the dressing in 5 days following surgery. Once your dressing is removed you may shower and / or wash your incision in a sink with soap and water. Do not soak your incision.   No bath tubs, hot tubs or swimming pools. After washing the wound please pat the incision dry thoroughly.   Please use mild soap. Please do not use hydrogen peroxide.   Please cover the wound with a band-aid or gauze and change it daily. Please do not apply any salves, cream, lotions or ointments to the surgical incision.   Otherwise keep incision clean and dry (no yard work, engine work, etc.)    Showering: You may shower following surgery but please adhere to above instructions regarding the dressing. If showering with bandage / splint in place please ensure that it is kept dry and covered while bathing. There are commercially available cast bags that can be used to protect your dressing while showering. If using garbage bags please make sure that there are no holes in the bag and that the bag has been sealed above the dressing. If the bandage gets wet you must contact your surgeon's office to make arrangements to be seen to have bandage changed.    Ice / Elevation: The application of ice to your surgical site after surgery will help with pain control and swelling. This can be very effective for 48-72 hours after surgery. Please be careful to avoid getting bandage wet from a leaky ice bag. Please be advised that the ice may need to be applied for longer periods of time for the cooling effect to penetrate the post-operative dressing. Elevation of the operative site above the level of the heart as much as possible for the first 48-72 hours after surgery. Use your sling if you have been provided one while standing or walking. If your fingers are not included in the dressing you are  encouraged to attempt finger range of motion as this will help with your hand swelling and ultimate recovery.     Pain Medication: If you received a regional anesthetic on the day of your surgery your arm and hand may be numb for up to 24 hours after surgery. It is important to wear your sling while the block is still effective in order to protect your arm. It is advisable to take pain medications prior to going to sleep in case the regional anesthesia medication wears off while you are sleeping. Take your pain medications as directed. Narcotic pain medications can cause lethargy, nausea and constipation. Please contact your surgeon's office and discontinue the medication if these symptoms  become problematic. Eating a regular diet, drinking fluids and walking can  help with constipation from these medications.   Avoid alcohol consumption     Additional pain control options:    You are encouraged to take over the counter medications like Advil / Motrin / Ibuprofen / Aleve in addition to your prescribed pain medications after surgery       Smoking / Tobacco: Tobacco use is known to interfere with wound and fracture healing and increase post-operative pain. It can also increase your risk of poor outcomes following surgery. Please do not use tobacco or nicotine products following your surgery. This includes smokeless tobacco, or nicotine replacement products (patches, gum, etc.)     Driving: It is not advisable to operate a vehicle while using narcotic pain medications. Additionally, please be advised that you are likely to have challenges operating a car or motorcycle while you are still in your postoperative dressing and this could increase your risk of being involved in an accident and being cited for driving while physically impaired.     Warning Signs: Observe your arm / hand and incision site (if visible) for increased redness, inflammation, drainage, odor or pain that is unrelieved by rest, elevation or  medication. Please contact your surgeon's office immediately if you develop any of these issues or if you develop a fever greater than 101°.     You do not yet have a post-operative appointment scheduled. Please contact Dr. Ivan's office at [phone] to schedule this appointment.

## 2025-02-12 NOTE — ANESTHESIA PROCEDURE NOTES
Airway  Date/Time: 2/12/2025 10:16 AM  Urgency: elective      Staffing  Performed: CRNA   Authorized by: Ish Fairbanks MD    Performed by: ADRIANA Richey-XIN  Patient location during procedure: OR    Indications and Patient Condition  Indications for airway management: anesthesia  Spontaneous Ventilation: absent  Sedation level: deep  Preoxygenated: yes  Patient position: sniffing  Mask difficulty assessment: 1 - vent by mask    Final Airway Details  Final airway type: supraglottic airway      Successful airway: classic  Size 4  Airway Seal Pressure (cm H2O): 10     Number of attempts at approach: 1

## 2025-02-12 NOTE — ANESTHESIA PREPROCEDURE EVALUATION
Patient: Tung Davis    Procedure Information       Date/Time: 02/12/25 1000    Procedure: Removal of orthopedic hardware right wrist and first dorsal apartment release / 60 mins (Right: Wrist)    Location: [facility] OR 04 / [facility]    Surgeons: Will B Beucler, DO            Relevant Problems   Neuro   (+) Depression with anxiety       Clinical information reviewed:   Tobacco  Allergies  Meds   Med Hx  Surg Hx  OB Status  Fam Hx  Soc   Hx        NPO Detail:  No data recorded     Physical Exam    Airway  Mallampati: I  TM distance: >3 FB  Neck ROM: full     Cardiovascular - normal exam     Dental - normal exam     Pulmonary - normal exam     Abdominal - normal exam           Anesthesia Plan    History of general anesthesia?: yes  History of complications of general anesthesia?: no    ASA 2     general     The patient is not a current smoker.    Anesthetic plan and risks discussed with patient.    Plan discussed with CAA and CRNA.

## 2025-02-27 NOTE — OP NOTE
Removal of orthopedic hardware right wrist and first dorsal apartment release / 60 mins (R) Operative Note     Date: 2025  OR Location: [facility] OR    Name: Tung Davis : 1985, Age: 39 y.o., MRN: [id number], Sex: female    Diagnosis  Pre-op Diagnosis      * Painful orthopaedic hardware (CMS-HCC) [T84.84XA] Post-op Diagnosis     * Painful orthopaedic hardware (CMS-HCC) [T84.84XA]     Procedures  Removal of orthopedic hardware right wrist and first dorsal apartment release / 60 mins  [address] - [place] REMOVAL IMPLANT DEEP      Surgeons      * Will B Beucler - Primary    Resident/Fellow/Other Assistant:  Surgeons and Role:  * No surgeons found with a matching role *    Staff:   Scrub Person: Brielle  Scrub Person: Minh  Circulator: Germán    Anesthesia Staff: Anesthesiologist: Ish Fairbanks MD  CRNA: ADRIANA Richey-CRNA    Procedure Summary  Anesthesia: General  ASA: II  Estimated Blood Loss: 5mL  Intra-op Medications:   Administrations occurring from 1000 to 1130 on 25:   Medication Name Total Dose   BUPivacaine HCl (Marcaine) 0.5 % (5 mg/mL) 5 mL, lidocaine (Xylocaine) 5 mL syringe 10 mL   sterile water irrigation solution 500 mL   ceFAZolin (Ancef) 1 g 2 g   dexAMETHasone (Decadron) injection 4 mg/mL 5 mg   fentaNYL PF 0.05 mg/mL 100 mcg   ketorolac (Toradol) 30 mg 30 mg   LR infusion Cannot be calculated   lidocaine (Xylocaine) 2 % 100 mg   midazolam (Versed) 1 mg/1 mL 2 mg   ondansetron (Zofran) 2 mg/mL injection 4 mg   propofol (Diprivan) infusion 10 mg/mL 536 mg   propofol (Diprivan) injection 10 mg/mL 200 mg              Anesthesia Record               Intraprocedure I/O Totals          Intake    Propofol Drip 54.85 mL    The total shown is the total volume documented since Anesthesia Start was filed.    LR infusion 500.00 mL    Total Intake 554.85 mL       Output    Est. Blood Loss 2 mL    Total Output 2 mL       Net    Net Volume 552.85 mL          Specimen: No specimens collected               Drains and/or Catheters: * None in log *    Tourniquet Times:     Total Tourniquet Time Documented:  Arm - Upper (Right) - 41 minutes  Total: Arm - Upper (Right) - 41 minutes      Implants:     Findings: Removal of volar locking plate with first dorsal compartment release    Indications: Tung Davis is an 39 y.o. female who is having surgery for Painful orthopaedic hardware (CMS-[facility]) [T84.84XA].  She is also having symptomatic first fourth compartment tendinitis    The patient was seen in the preoperative area. The risks, benefits, complications, treatment options, non-operative alternatives, expected recovery and outcomes were discussed with the patient. The possibilities of reaction to medication, pulmonary aspiration, injury to surrounding structures, bleeding, recurrent infection, the need for additional procedures, failure to diagnose a condition, and creating a complication requiring transfusion or operation were discussed with the patient. The patient concurred with the proposed plan, giving informed consent.  The site of surgery was properly noted/marked if necessary per policy. The patient has been actively warmed in preoperative area. Preoperative antibiotics have been ordered and given within 1 hours of incision. Venous thrombosis prophylaxis are not indicated.    Procedure Details: Indications for procedure:     I met and evaluated the patient in the preoperative holding area today prior to the patient receiving any sedation or other medications which may alter their mental status. I confirmed their identity via the facility's protocol. I reviewed the patient's history, physical exam, and recommendation for surgery. The indications for surgical versus nonsurgical management of the condition were discussed, including the inherent risks, benefits, prognosis of the condition.  The risks of surgery include, but are not limited to, pain, bleeding, infection, tendon damage, nerve damage, vessel  damage, and need for further surgery.  The risks also include wound healing problems, decreased long-term functionality of the wrist and hand, tendon irritation, tendon rupture, and possible need for therapy for an optimum outcome.  There is a risk of complex regional pain syndrome, incomplete recovery, incomplete relief or worsening of symptoms, and recurrence.  We also discussed that medical complications are possible during and after surgery related to either anesthesia or the surgical procedure itself. Specific discussion of the risks of the proposed anesthetic plan will be discussed by the patient's anesthesia provider. All risks were reviewed in layman´s terms. The patient was given ample time to ask appropriate questions and appeared to be satisfied with the answers provided. All questions were answered and no guarantees have been given regarding the patient's condition and treatment recommendations. The general plan for the postoperative rehabilitation course, including possible need for therapy, was reviewed at great length. Informed consent was signed by all appropriate parties. The surgical site was marked in ink by myself.        Procedure in Detail:  The patient was transported to the operating room and placed in the supine position on the operating table. All extremities and prominences were appropriately padded. Adequate anesthesia was induced. A non-sterile tourniquet was applied high on the right arm. The right arm was prepped and draped in standard sterile fashion. A time-out was conducted prior to beginning the operation to confirm the patient's identity, planned procedures, laterality of procedures, and that appropriate antibiotics had been administered within 30 minutes of incision. The right upper extremity was exsanguinated with an Esmarch bandage, and the tourniquet was inflated to 250mmHg.     The previous incision over the FCR tendon was made.  Full-thickness skin flaps were elevated.  The  FCR tendon was identified.  The FCR tendon sheath was released on its radial margin.  This was retracted ulnarly.  The floor the FCR sheath was then released.  The FPL tendon was retracted ulnarly.  The pronator quadratus was identified and released on the radial margin this was released and retracted ulnarly.  The Arthrex plate was identified.  The appropriate screwdrivers were used and apical screws were removed.  A Cunningham elevator was placed underneath the plate to elevate it off.  The plate was then removed.  A rongeur was used to smooth the volar cortex at the screw hole sites.  The wound was copiously irrigated.    Attention was then turned to the first dorsal compartment.  A separate incision was made for this.    A 2cm oblique incision was made beginning at the volar radial styloid, coursing proximally along the first dorsal compartment.  Dissection was performed to expose the first dorsal extensor compartment and the extensor retinaculum while minimizing soft tissue dissection planes and minimizing disturbance of the superficial branch of the radial nerve and the LABCN.  Under direct visualization, the first dorsal compartment was opened with an oblique incision running parallel to the first compartment tendons and dorsal to the APL to preserve a volar retinacular flap.     The extensor pollicis brevis and abductor pollicis longus tendons were identified.  The EPB was note to run within a subcompartment within the first extensor compartment - this was decompressed after decompression of the first extensor compartment with division of the extensor retinaculum from just distal to the radial styloid to proximal to the proximal margin of the extensor retinaculum.     The first extensor compartment was noted to have minimal tenosynovitis consistent.  There was no evidence of tendon rupture or impending rupture. Tenosynovectomy was performed.  After decompression the tendons were noted to be stable and were  without instability / dislocation with wrist flexion, extension, pronation, and supination in the OR.     20 cc of local anesthetic consisting 1% lidocaine and 0.5% Marcaine were injected to the surgical sites.    The tourniquet was deflated and meticulous hemostasis achieved with bipolar electrocautery. The wounds were copiously irrigated with sterile saline.  The incision over the distal radius was closed with 4-0 nylon suture in horizontal mattress fashion.  The first dorsal compartment incision was closed with 4-0 Monocryl buried and a running 4-0 Monocryl suture with Steri-Strips    A bulky sterile dressing consisting of Xeroform, 4 x 4's, Webril and Ace bandage was placed.      The patient was transferred to the hospital bed and transported to the post-anesthesia care unit in good condition having tolerated the procedure well. All sponge, needle, and instrument counts were correct x2. Postoperatively, the digits were pink and well perfused and the hand compartments and soft tissues were supple. No complications were apparent.    Postoperative Plan:  The patient will be limited weight bearing on their operative site. Their dressing will be removed 5 days.   They will return to clinic in 2 weeks. X-rays not needed on return visit.  Complications:  None; patient tolerated the procedure well.    Disposition: PACU - hemodynamically stable.  Condition: stable         Additional Details: See op note    Attending Attestation: I performed the procedure.    Natalio Ivan  Phone Number: [phone]

## 2025-03-03 ENCOUNTER — OFFICE VISIT (OUTPATIENT)
Dept: ORTHOPEDIC SURGERY | Facility: HOSPITAL | Age: 40
End: 2025-03-03
Payer: COMMERCIAL

## 2025-03-03 DIAGNOSIS — T84.84XA PAINFUL ORTHOPAEDIC HARDWARE (CMS-HCC): Primary | ICD-10-CM

## 2025-03-03 PROCEDURE — 99211 OFF/OP EST MAY X REQ PHY/QHP: CPT | Performed by: STUDENT IN AN ORGANIZED HEALTH CARE EDUCATION/TRAINING PROGRAM

## 2025-03-03 PROCEDURE — 1036F TOBACCO NON-USER: CPT | Performed by: STUDENT IN AN ORGANIZED HEALTH CARE EDUCATION/TRAINING PROGRAM

## 2025-03-03 ASSESSMENT — PAIN - FUNCTIONAL ASSESSMENT: PAIN_FUNCTIONAL_ASSESSMENT: NO/DENIES PAIN

## 2025-03-03 NOTE — PROGRESS NOTES
[facility]  Hand and Upper Extremity Service  Post Operative visit        Date of surgery: 2/12/2025  Surgery(s) performed: Removal of orthopedic hardware right wrist and first dorsal compartment release        Subjective report:   Patient is doing well postoperatively.  She has a physician and had one of her colleagues remove her sutures.  She denies numbness and tingling.  She is been working on finger and wrist range of motion.  She continues to note significant stiffness primarily to her wrist.          Exam findings; right upper extremity  Incisions are well-healed.  Her sutures have already been removed.  There is no erythema warmth or drainage.  Full finger range of motion.  AIN, PIN, ulnar motor nerves intact.  Sensation intact light touch radial, [facility] nerves.  Brisk capillary refill     Radiograph findings: None        Plan:   Patient is to remain limited weightbearing for the next 4 weeks.  At that point she may gradually return to activity as tolerated.  She should continue work on range of motion.        Medications Prescribed: None           Will Beucler, DO  [employer]  Department of Orthopaedic Surgery  Hand and Upper Extremity Surgery  [facility]     Dictation performed with the use of voice recognition software. Syntax and grammatical errors may exist.

## 2025-03-07 DIAGNOSIS — M25.531 WRIST PAIN, ACUTE, RIGHT: Primary | ICD-10-CM

## 2025-03-07 RX ORDER — MELOXICAM 7.5 MG/1
7.5 TABLET ORAL DAILY
Qty: 30 TABLET | Refills: 1 | Status: SHIPPED | OUTPATIENT
Start: 2025-03-07 | End: 2025-05-06

## 2025-04-30 DIAGNOSIS — F41.8 DEPRESSION WITH ANXIETY: ICD-10-CM

## 2025-04-30 RX ORDER — ESCITALOPRAM OXALATE 10 MG/1
10 TABLET ORAL DAILY
Qty: 90 TABLET | Refills: 1 | Status: SHIPPED | OUTPATIENT
Start: 2025-04-30

## 2025-07-17 ENCOUNTER — APPOINTMENT (OUTPATIENT)
Dept: OBSTETRICS AND GYNECOLOGY | Facility: CLINIC | Age: 40
End: 2025-07-17
Payer: COMMERCIAL

## 2025-07-21 ENCOUNTER — APPOINTMENT (OUTPATIENT)
Facility: CLINIC | Age: 40
End: 2025-07-21
Payer: COMMERCIAL

## 2025-07-21 VITALS
SYSTOLIC BLOOD PRESSURE: 126 MMHG | HEIGHT: 63 IN | WEIGHT: 165 LBS | BODY MASS INDEX: 29.23 KG/M2 | DIASTOLIC BLOOD PRESSURE: 84 MMHG

## 2025-07-21 DIAGNOSIS — Z12.4 CERVICAL CANCER SCREENING: ICD-10-CM

## 2025-07-21 DIAGNOSIS — Z12.31 BREAST CANCER SCREENING BY MAMMOGRAM: ICD-10-CM

## 2025-07-21 DIAGNOSIS — Z01.419 ENCOUNTER FOR ANNUAL ROUTINE GYNECOLOGICAL EXAMINATION: Primary | ICD-10-CM

## 2025-07-21 DIAGNOSIS — N95.1 PERIMENOPAUSE: ICD-10-CM

## 2025-07-21 DIAGNOSIS — R53.83 OTHER FATIGUE: ICD-10-CM

## 2025-07-21 PROCEDURE — 87626 HPV SEP HI-RSK TYP&POOL RSLT: CPT

## 2025-07-21 PROCEDURE — 88175 CYTOPATH C/V AUTO FLUID REDO: CPT

## 2025-07-21 PROCEDURE — 99213 OFFICE O/P EST LOW 20 MIN: CPT | Performed by: OBSTETRICS & GYNECOLOGY

## 2025-07-21 PROCEDURE — 3008F BODY MASS INDEX DOCD: CPT | Performed by: OBSTETRICS & GYNECOLOGY

## 2025-07-21 PROCEDURE — 99396 PREV VISIT EST AGE 40-64: CPT | Performed by: OBSTETRICS & GYNECOLOGY

## 2025-07-21 RX ORDER — ESTRADIOL 0.05 MG/D
1 PATCH TRANSDERMAL WEEKLY
Qty: 12 PATCH | Refills: 3 | Status: SHIPPED | OUTPATIENT
Start: 2025-07-21 | End: 2026-07-21

## 2025-07-21 RX ORDER — LEVONORGESTREL 52 MG/1
1 INTRAUTERINE DEVICE INTRAUTERINE ONCE
COMMUNITY

## 2025-07-21 ASSESSMENT — ENCOUNTER SYMPTOMS
HEMATURIA: 0
BACK PAIN: 0
DYSURIA: 0
UNEXPECTED WEIGHT CHANGE: 0
ABDOMINAL DISTENTION: 0
SHORTNESS OF BREATH: 0
APPETITE CHANGE: 0
CHILLS: 0
FATIGUE: 0
COLOR CHANGE: 0
VOMITING: 0
NAUSEA: 0
FREQUENCY: 0
DIARRHEA: 0
ABDOMINAL PAIN: 0
FEVER: 0
SLEEP DISTURBANCE: 0
FLANK PAIN: 0
CONSTIPATION: 0
BLOOD IN STOOL: 0

## 2025-07-21 NOTE — PROGRESS NOTES
"Tung Davis is a 39 y.o.  here for well woman exam.    Medical and surgical histories reviewed with patient.     Concerns: pt reports fatigue, irritability, low libido and difficulty sleeping over the last year - concerned symptoms are related to perimenopause    Exercise: regular     GynHx:  Cycles: no cycles with Mirena IUD, placed   Sexually active: Yes with one male partner/   Contraception: Mirena IUD, placed   No LMP recorded. (Menstrual status: IUD).     OB History          3    Para   3    Term   3            AB        Living   3         SAB        IAB        Ectopic        Multiple        Live Births                     Objective     Review of Systems   Constitutional:  Negative for appetite change, chills, fatigue, fever and unexpected weight change.   Respiratory:  Negative for shortness of breath.    Cardiovascular:  Negative for chest pain.   Gastrointestinal:  Negative for abdominal distention, abdominal pain, blood in stool, constipation, diarrhea, nausea and vomiting.   Endocrine: Negative for cold intolerance and heat intolerance.   Genitourinary:  Negative for dyspareunia, dysuria, flank pain, frequency, genital sores, hematuria, menstrual problem, pelvic pain, urgency, vaginal bleeding, vaginal discharge and vaginal pain.   Musculoskeletal:  Negative for back pain.   Skin:  Negative for color change.   Psychiatric/Behavioral:  Negative for sleep disturbance.        /84   Ht 1.6 m (5' 3\")   Wt 74.8 kg (165 lb)   BMI 29.23 kg/m²     Physical Exam  Constitutional:       Appearance: Normal appearance.   HENT:      Head: Normocephalic and atraumatic.   Chest:   Breasts:     Right: Normal.      Left: Normal.   Abdominal:      General: Abdomen is flat.      Palpations: Abdomen is soft.      Tenderness: There is no abdominal tenderness.   Genitourinary:     General: Normal vulva.      Vagina: Normal.      Cervix: Normal.      Uterus: Normal.       Adnexa: " Right adnexa normal and left adnexa normal.      Comments: +IUD thread seen  Pap collected today      Skin:     General: Skin is warm and dry.     Neurological:      Mental Status: She is alert and oriented to person, place, and time.     Psychiatric:         Mood and Affect: Mood normal.          Assessment and Plan:  Routine Well Woman Exam Today.   Discussed diet and exercise and routine health screening.   Pap: pap done today   Recommend annual mammograms.  Last mammogram 10/2024.Mammogram ordered for 10/2025.    Fatigue/Sleep disturbance  - possible perimenopause   Pt aware these symptoms could be multi-factorial. Labs done by PCP wnl 10/2024.    Pt reports anxiety/depression is stable on lexapro. Does endorse some irritability.   Discussed treating possible perimenopause symptoms with estradiol patch (pt using levonorgestrel IUD for birth control/cycle control).  Pt agreeable, rx sent, will trial for 3-4 months.         No orders of the defined types were placed in this encounter.

## 2025-08-06 LAB
CYTOLOGY CMNT CVX/VAG CYTO-IMP: NORMAL
HPV HR 12 DNA GENITAL QL NAA+PROBE: NEGATIVE
HPV HR GENOTYPES PNL CVX NAA+PROBE: NEGATIVE
HPV16 DNA SPEC QL NAA+PROBE: NEGATIVE
HPV18 DNA SPEC QL NAA+PROBE: NEGATIVE
LAB AP HPV GENOTYPE QUESTION: YES
LAB AP HPV HR: NORMAL
LABORATORY COMMENT REPORT: NORMAL
PATH REPORT.TOTAL CANCER: NORMAL

## 2025-09-29 ENCOUNTER — APPOINTMENT (OUTPATIENT)
Dept: PRIMARY CARE | Facility: CLINIC | Age: 40
End: 2025-09-29
Payer: COMMERCIAL

## 2025-10-20 ENCOUNTER — APPOINTMENT (OUTPATIENT)
Dept: RADIOLOGY | Facility: CLINIC | Age: 40
End: 2025-10-20
Payer: COMMERCIAL

## 2025-10-21 ENCOUNTER — APPOINTMENT (OUTPATIENT)
Dept: RADIOLOGY | Facility: CLINIC | Age: 40
End: 2025-10-21
Payer: COMMERCIAL

## 2025-12-02 ENCOUNTER — APPOINTMENT (OUTPATIENT)
Dept: PRIMARY CARE | Facility: CLINIC | Age: 40
End: 2025-12-02
Payer: COMMERCIAL

## 2026-07-27 ENCOUNTER — APPOINTMENT (OUTPATIENT)
Facility: CLINIC | Age: 41
End: 2026-07-27
Payer: COMMERCIAL

## (undated) DEVICE — SOLUTION, CHLORHEXIDINE, 4%, 4OZ

## (undated) DEVICE — GLOVE, SURGICAL, PROTEXIS PI , 7.5, PF, LF

## (undated) DEVICE — Device

## (undated) DEVICE — SUTURE, VICRYL, 3-0,18 IN, SH, UNDYED

## (undated) DEVICE — ADHESIVE, SKIN, MASTISOL, 2/3 CC VIAL

## (undated) DEVICE — PENCIL, ELECTROSURG, W/BUTTON SWITCH & HOLSTER, EZ CLEAN, DISP

## (undated) DEVICE — DRILL BIT, CALIBRATED, 2.5MM

## (undated) DEVICE — GLOVE, SURGICAL, PROTEXIS PI BLUE W/NEUTHERA, 7.5, PF, LF

## (undated) DEVICE — SCREW, 3.5 X 16 LP TI: Type: IMPLANTABLE DEVICE | Site: WRIST | Status: NON-FUNCTIONAL

## (undated) DEVICE — CUFF, TOURNIQUET, 18 X 4, DUAL PORT/SNGL BLADDER, DISP, LF

## (undated) DEVICE — PADDING, UNDERCAST, WEBRIL, 3 IN X 4 YD, REG, NS

## (undated) DEVICE — GOWN, SURGICAL, SIRUS, NON REINFORCED, LARGE

## (undated) DEVICE — GUIDEWIRE, 1.35MM, W/ TROCAR TIP

## (undated) DEVICE — DRESSING, NON-ADHERENT, 3 X 3 IN, STERILE

## (undated) DEVICE — SLING, ARM, LARGE

## (undated) DEVICE — STRIP, SKIN CLOSURE, STERI STRIP, REINFORCED, 0.5 X 4 IN

## (undated) DEVICE — DRILL BIT, 1.7MM

## (undated) DEVICE — DRAPE KIT, MINI C-ARM

## (undated) DEVICE — BANDAGE, ELASTIC, MATRIX, SELF-CLOSURE, 2 IN X 5 YD, LF

## (undated) DEVICE — SUTURE, PROLENE, 3-0, 18 IN, PS2, BLUE

## (undated) DEVICE — SPONGE, GAUZE, 12 PLY, 4 X 4, STERILE, DISP

## (undated) DEVICE — SUTURE, MONOCRYL, 4-0, 27 IN, PS-2, UNDYED

## (undated) DEVICE — TRAY, DRY PREP, PREMIUM

## (undated) DEVICE — APPLICATOR, CHLORAPREP, W/ORANGE TINT, 26ML

## (undated) DEVICE — NEEDLE, ELECTRODE, ELECTROSURGICAL, INSULATED

## (undated) DEVICE — BLANKET, LOWER BODY, VHA PLUS, ADULT

## (undated) DEVICE — TUBING, SUCTION, 6MM X 10, CLEAN N-COND

## (undated) DEVICE — SUTURE, MONOCRYL, 3-0, 27 IN, PS-2, UNDYED